# Patient Record
Sex: MALE | Race: WHITE | NOT HISPANIC OR LATINO | Employment: OTHER | URBAN - METROPOLITAN AREA
[De-identification: names, ages, dates, MRNs, and addresses within clinical notes are randomized per-mention and may not be internally consistent; named-entity substitution may affect disease eponyms.]

---

## 2024-08-12 ENCOUNTER — LAB (OUTPATIENT)
Dept: LAB | Facility: CLINIC | Age: 74
End: 2024-08-12
Payer: COMMERCIAL

## 2024-08-12 ENCOUNTER — LAB REQUISITION (OUTPATIENT)
Dept: LAB | Facility: CLINIC | Age: 74
End: 2024-08-12
Payer: COMMERCIAL

## 2024-08-12 DIAGNOSIS — M25.569 PAIN IN UNSPECIFIED KNEE: ICD-10-CM

## 2024-08-12 DIAGNOSIS — M25.569 PAIN IN KNEE JOINT: Primary | ICD-10-CM

## 2024-08-12 DIAGNOSIS — M25.461 EFFUSION, RIGHT KNEE: ICD-10-CM

## 2024-08-12 LAB
BACTERIA SPEC CULT: NORMAL
BASOPHILS # BLD AUTO: 0 10E3/UL (ref 0–0.2)
BASOPHILS NFR BLD AUTO: 0 %
EOSINOPHIL # BLD AUTO: 0.2 10E3/UL (ref 0–0.7)
EOSINOPHIL NFR BLD AUTO: 3 %
ERYTHROCYTE [DISTWIDTH] IN BLOOD BY AUTOMATED COUNT: 13.2 % (ref 10–15)
GRAM STAIN RESULT: NORMAL
GRAM STAIN RESULT: NORMAL
HCT VFR BLD AUTO: 38.3 % (ref 40–53)
HGB BLD-MCNC: 12.5 G/DL (ref 13.3–17.7)
IMM GRANULOCYTES # BLD: 0 10E3/UL
IMM GRANULOCYTES NFR BLD: 0 %
LYMPHOCYTES # BLD AUTO: 0.6 10E3/UL (ref 0.8–5.3)
LYMPHOCYTES NFR BLD AUTO: 11 %
MCH RBC QN AUTO: 30.3 PG (ref 26.5–33)
MCHC RBC AUTO-ENTMCNC: 32.6 G/DL (ref 31.5–36.5)
MCV RBC AUTO: 93 FL (ref 78–100)
MONOCYTES # BLD AUTO: 0.5 10E3/UL (ref 0–1.3)
MONOCYTES NFR BLD AUTO: 9 %
NEUTROPHILS # BLD AUTO: 4.5 10E3/UL (ref 1.6–8.3)
NEUTROPHILS NFR BLD AUTO: 76 %
NRBC # BLD AUTO: 0 10E3/UL
NRBC BLD AUTO-RTO: 0 /100
PLATELET # BLD AUTO: 193 10E3/UL (ref 150–450)
RBC # BLD AUTO: 4.12 10E6/UL (ref 4.4–5.9)
RHEUMATOID FACT SERPL-ACNC: <10 IU/ML
URATE SERPL-MCNC: 5 MG/DL (ref 3.4–7)
WBC # BLD AUTO: 5.9 10E3/UL (ref 4–11)

## 2024-08-12 PROCEDURE — 87075 CULTR BACTERIA EXCEPT BLOOD: CPT | Mod: ORL | Performed by: PHYSICIAN ASSISTANT

## 2024-08-12 PROCEDURE — 89051 BODY FLUID CELL COUNT: CPT | Mod: ORL | Performed by: PHYSICIAN ASSISTANT

## 2024-08-12 PROCEDURE — 36415 COLL VENOUS BLD VENIPUNCTURE: CPT

## 2024-08-12 PROCEDURE — 86431 RHEUMATOID FACTOR QUANT: CPT

## 2024-08-12 PROCEDURE — 86618 LYME DISEASE ANTIBODY: CPT

## 2024-08-12 PROCEDURE — 85025 COMPLETE CBC W/AUTO DIFF WBC: CPT

## 2024-08-12 PROCEDURE — 84550 ASSAY OF BLOOD/URIC ACID: CPT

## 2024-08-12 PROCEDURE — 87205 SMEAR GRAM STAIN: CPT | Mod: ORL | Performed by: PHYSICIAN ASSISTANT

## 2024-08-12 PROCEDURE — 87070 CULTURE OTHR SPECIMN AEROBIC: CPT | Mod: ORL | Performed by: PHYSICIAN ASSISTANT

## 2024-08-13 LAB
APPEARANCE FLD: ABNORMAL
B BURGDOR IGG+IGM SER QL: 0.07
CELL COUNT BODY FLUID SOURCE: ABNORMAL
COLOR FLD: YELLOW
LYMPHOCYTES NFR FLD MANUAL: 31 %
MONOS+MACROS NFR FLD MANUAL: 31 %
NEUTS BAND NFR FLD MANUAL: 38 %
PATH REV: NORMAL
RBC # FLD: 5 /UL
WBC # FLD AUTO: 6777 /UL

## 2024-08-17 LAB — BACTERIA SNV CULT: NO GROWTH

## 2024-08-22 ENCOUNTER — HOSPITAL ENCOUNTER (EMERGENCY)
Facility: CLINIC | Age: 74
Discharge: HOME | End: 2024-08-22
Attending: EMERGENCY MEDICINE | Admitting: EMERGENCY MEDICINE
Payer: COMMERCIAL

## 2024-08-22 ENCOUNTER — NURSE TRIAGE (OUTPATIENT)
Dept: FAMILY MEDICINE | Facility: CLINIC | Age: 74
End: 2024-08-22
Payer: COMMERCIAL

## 2024-08-22 VITALS
DIASTOLIC BLOOD PRESSURE: 79 MMHG | WEIGHT: 245 LBS | OXYGEN SATURATION: 96 % | SYSTOLIC BLOOD PRESSURE: 139 MMHG | HEART RATE: 65 BPM | RESPIRATION RATE: 18 BRPM | TEMPERATURE: 98.1 F

## 2024-08-22 DIAGNOSIS — R53.1 WEAKNESS GENERALIZED: ICD-10-CM

## 2024-08-22 DIAGNOSIS — M25.50 MULTIPLE JOINT PAIN: ICD-10-CM

## 2024-08-22 LAB
ALBUMIN SERPL BCG-MCNC: 4 G/DL (ref 3.5–5.2)
ALP SERPL-CCNC: 74 U/L (ref 40–150)
ALT SERPL W P-5'-P-CCNC: 18 U/L (ref 0–70)
ANION GAP SERPL CALCULATED.3IONS-SCNC: 10 MMOL/L (ref 7–15)
AST SERPL W P-5'-P-CCNC: 16 U/L (ref 0–45)
BASOPHILS # BLD AUTO: 0 10E3/UL (ref 0–0.2)
BASOPHILS NFR BLD AUTO: 0 %
BILIRUB SERPL-MCNC: 0.8 MG/DL
BUN SERPL-MCNC: 24.1 MG/DL (ref 8–23)
CALCIUM SERPL-MCNC: 9.1 MG/DL (ref 8.8–10.4)
CHLORIDE SERPL-SCNC: 101 MMOL/L (ref 98–107)
CREAT SERPL-MCNC: 0.92 MG/DL (ref 0.67–1.17)
CRP SERPL-MCNC: 24.84 MG/L
EGFRCR SERPLBLD CKD-EPI 2021: 86 ML/MIN/1.73M2
EOSINOPHIL # BLD AUTO: 0.3 10E3/UL (ref 0–0.7)
EOSINOPHIL NFR BLD AUTO: 4 %
ERYTHROCYTE [DISTWIDTH] IN BLOOD BY AUTOMATED COUNT: 12.9 % (ref 10–15)
GLUCOSE SERPL-MCNC: 108 MG/DL (ref 70–99)
HCO3 SERPL-SCNC: 26 MMOL/L (ref 22–29)
HCT VFR BLD AUTO: 39.7 % (ref 40–53)
HGB BLD-MCNC: 13.1 G/DL (ref 13.3–17.7)
IMM GRANULOCYTES # BLD: 0 10E3/UL
IMM GRANULOCYTES NFR BLD: 1 %
LYMPHOCYTES # BLD AUTO: 1.5 10E3/UL (ref 0.8–5.3)
LYMPHOCYTES NFR BLD AUTO: 18 %
MAGNESIUM SERPL-MCNC: 2 MG/DL (ref 1.7–2.3)
MCH RBC QN AUTO: 30.3 PG (ref 26.5–33)
MCHC RBC AUTO-ENTMCNC: 33 G/DL (ref 31.5–36.5)
MCV RBC AUTO: 92 FL (ref 78–100)
MONOCYTES # BLD AUTO: 0.9 10E3/UL (ref 0–1.3)
MONOCYTES NFR BLD AUTO: 11 %
NEUTROPHILS # BLD AUTO: 5.5 10E3/UL (ref 1.6–8.3)
NEUTROPHILS NFR BLD AUTO: 67 %
NRBC # BLD AUTO: 0 10E3/UL
NRBC BLD AUTO-RTO: 0 /100
PLATELET # BLD AUTO: 217 10E3/UL (ref 150–450)
POTASSIUM SERPL-SCNC: 4.4 MMOL/L (ref 3.4–5.3)
PROT SERPL-MCNC: 7 G/DL (ref 6.4–8.3)
RBC # BLD AUTO: 4.32 10E6/UL (ref 4.4–5.9)
SODIUM SERPL-SCNC: 137 MMOL/L (ref 135–145)
TSH SERPL DL<=0.005 MIU/L-ACNC: 2.37 UIU/ML (ref 0.3–4.2)
WBC # BLD AUTO: 8.2 10E3/UL (ref 4–11)

## 2024-08-22 PROCEDURE — 84443 ASSAY THYROID STIM HORMONE: CPT | Performed by: EMERGENCY MEDICINE

## 2024-08-22 PROCEDURE — 83735 ASSAY OF MAGNESIUM: CPT | Performed by: EMERGENCY MEDICINE

## 2024-08-22 PROCEDURE — 87798 DETECT AGENT NOS DNA AMP: CPT | Mod: 59 | Performed by: EMERGENCY MEDICINE

## 2024-08-22 PROCEDURE — 99283 EMERGENCY DEPT VISIT LOW MDM: CPT

## 2024-08-22 PROCEDURE — 80053 COMPREHEN METABOLIC PANEL: CPT | Performed by: EMERGENCY MEDICINE

## 2024-08-22 PROCEDURE — 86140 C-REACTIVE PROTEIN: CPT | Performed by: EMERGENCY MEDICINE

## 2024-08-22 PROCEDURE — 85004 AUTOMATED DIFF WBC COUNT: CPT | Performed by: EMERGENCY MEDICINE

## 2024-08-22 PROCEDURE — 36415 COLL VENOUS BLD VENIPUNCTURE: CPT | Performed by: EMERGENCY MEDICINE

## 2024-08-22 RX ORDER — DOXYCYCLINE 100 MG/1
100 CAPSULE ORAL 2 TIMES DAILY
Qty: 28 CAPSULE | Refills: 0 | Status: SHIPPED | OUTPATIENT
Start: 2024-08-22 | End: 2024-09-05

## 2024-08-22 ASSESSMENT — ACTIVITIES OF DAILY LIVING (ADL)
ADLS_ACUITY_SCORE: 35
ADLS_ACUITY_SCORE: 35

## 2024-08-22 ASSESSMENT — COLUMBIA-SUICIDE SEVERITY RATING SCALE - C-SSRS
6. HAVE YOU EVER DONE ANYTHING, STARTED TO DO ANYTHING, OR PREPARED TO DO ANYTHING TO END YOUR LIFE?: NO
1. IN THE PAST MONTH, HAVE YOU WISHED YOU WERE DEAD OR WISHED YOU COULD GO TO SLEEP AND NOT WAKE UP?: NO
2. HAVE YOU ACTUALLY HAD ANY THOUGHTS OF KILLING YOURSELF IN THE PAST MONTH?: NO

## 2024-08-22 NOTE — ED PROVIDER NOTES
Emergency Department Note      History of Present Illness     Chief Complaint   Extremity Weakness      HPI   Stoney Ramos is a 74 year old male with a history of hyperlipidemia, hypertension, gout who presents with extremity weakness. Patient notes that he had a sudden onset of leg aches, shoulder pain, and overall joint and muscle pain a month ago. States that his pain has progressed for the past month. States that he saw a primary care provider in [place], his primary residence, when his pain was only localized to his right knee. Claims that his joint and muscle pain has become more systemic. States that he went to [facility] last week and had fluid drained from his right knee, and received a cortisone shot that helped alleviate some of the pain but did not resolve his weakness. States that the prior [facility] treatment plan consisted of treating gout with Allopurinol and colchicine, but notes that his weakness sustained. States that he hasn't had a gout flare-up in years. Notes that he had a negative lyme's disease test last week, and that is wife administered a dose of amoxicillin for the patient despite the test. Reports that he has been holding himself when using the toilet, can't put shoes on, and is unable to reach the floor due to pain in his hips. Endorses back pain. Endorses hand spasms. Endorses numbness in his 3rd, 4th, 5th toes bilaterally. Endorses a history of L5-S1 back surgery. Of note, patient reports that he is on Lipitor. Denies fevers, chills, cough, runny nose, abdominal pain. Denies diplopia, facial droop, slurred speech. Denies rash. Denies saddle numbness. Denies urinary or bowel incontinence. Denies arm numbness.    Independent Historian   None    Review of External Notes       Past Medical History     Medical History and Problem List   Adjustment disorder  Gout  Depression  Hyperlipidemia  Hypertension   Neuropathy  Osteoarthritis of shoulder  Osteopenia    Medications    Zyloprim  Lipitor  Colcrys  Synthroid    Physical Exam     Patient Vitals for the past 24 hrs:   BP Temp Temp src Pulse Resp SpO2 Weight   08/22/24 1301 139/79 98.1  F (36.7  C) Oral 65 18 96 % 111.1 kg (245 lb)     Physical Exam  Constitutional: Well appearing.  HEENT: Atraumatic.  Moist mucous membranes.  Neck: Soft.  Supple.   Cardiac: Regular rate and rhythm.  No murmur or rub.  Respiratory: Clear to auscultation bilaterally.  No respiratory distress.  No wheezing, rhonchi, or rales.  Abdomen: Soft and nontender.  No guarding.  Nondistended.  Musculoskeletal: No edema.  Normal range of motion.  Full range of motion of the hips.  Bears weight without difficulty. DP and PT pulses 2+ and symmetric with cap refill less than 3 seconds throughout the lower extremities.   Neurologic: Alert and oriented x3.  Normal tone and bulk.  No facial drooping.  Normal speech.  5/5 strength in bilateral upper and lower extremities.  Sensation to light touch intact throughout.  DTRs are 2+ and symmetric at the bilateral patella and Achilles.  Normal gait.  Skin: No rashes.  No edema.  Psych: Normal affect.  Normal behavior.            Diagnostics     Lab Results   Labs Ordered and Resulted from Time of ED Arrival to Time of ED Departure   COMPREHENSIVE METABOLIC PANEL - Abnormal       Result Value    Sodium 137      Potassium 4.4      Carbon Dioxide (CO2) 26      Anion Gap 10      Urea Nitrogen 24.1 (*)     Creatinine 0.92      GFR Estimate 86      Calcium 9.1      Chloride 101      Glucose 108 (*)     Alkaline Phosphatase 74      AST 16      ALT 18      Protein Total 7.0      Albumin 4.0      Bilirubin Total 0.8     CRP INFLAMMATION - Abnormal    CRP Inflammation 24.84 (*)    CBC WITH PLATELETS AND DIFFERENTIAL - Abnormal    WBC Count 8.2      RBC Count 4.32 (*)     Hemoglobin 13.1 (*)     Hematocrit 39.7 (*)     MCV 92      MCH 30.3      MCHC 33.0      RDW 12.9      Platelet Count 217      % Neutrophils 67      % Lymphocytes 18       % Monocytes 11      % Eosinophils 4      % Basophils 0      % Immature Granulocytes 1      NRBCs per 100 WBC 0      Absolute Neutrophils 5.5      Absolute Lymphocytes 1.5      Absolute Monocytes 0.9      Absolute Eosinophils 0.3      Absolute Basophils 0.0      Absolute Immature Granulocytes 0.0      Absolute NRBCs 0.0     TSH WITH FREE T4 REFLEX - Normal    TSH 2.37     MAGNESIUM - Normal    Magnesium 2.0     TICK-BORNE DISEASE PANEL BY PCR       Imaging   No orders to display     Independent Interpretation   None    ED Course      Medications Administered   Medications - No data to display    Procedures   Procedures     Discussion of Management   None    ED Course   ED Course as of 08/22/24 1333   Thu Aug 22, 2024   1304 I obtained history and examined the patient as noted above         Additional Documentation  None    Medical Decision Making / Diagnosis     CMS Diagnoses: None    MIPS       None    [facility]   Stoney Ramos is a 74 year old male who is afebrile and hemodynamically stable.  On exam, he is neurologically intact with no focal deficits.  He expresses vague soreness in his hips and lower back.  He has no red flag back pain symptoms that would necessitate emergent imaging such as MRI to rule out infectious or inflammatory condition such as cauda equina syndrome, epidural abscess, or other.  Lab workup as noted as above.  Wife feels his symptoms are consistent with previous Lyme disease and request antibiotic treatment.  Patient himself thinks it may be related to his statin.  Will stop his statin for a few days and see how he feels.  At this point, he is neurologically intact and can get up without difficulty.  He has trouble touching the floor, however, I do not see any emergent conditions such as cauda equina syndrome, transverse myelitis, or other acute neurologic condition.  I did discuss referral to neurology to rule out any progressive neurologic condition and I placed emergent referral for  primary care as he mainly lives in [place] but is here for few months.  I think is very reasonable and do not believe he requires hospitalization.  He is in agreement and feels comfortable discharging home.  I will provide a paper prescription for doxycycline should his test be positive for Lyme disease.  He is in agreement and feels comfortable with this plan.  His questions were answered.  Again very strict return precautions including any new or changing or progressive neurologic symptoms.  He was in no distress at time of discharge.    Disposition   The patient was discharged.     Diagnosis     ICD-10-CM    1. Multiple joint pain  M25.50 Primary Care Referral      2. Weakness generalized  R53.1 Primary Care Referral     Adult Neurology  Referral           Discharge Medications   Discharge Medication List as of 8/22/2024  2:59 PM        START taking these medications    Details   doxycycline hyclate (VIBRAMYCIN) 100 MG capsule Take 1 capsule (100 mg) by mouth 2 times daily for 14 days., Disp-28 capsule, R-0, Local Print               Scribe Disclosure:  I, Beny Jackson, am serving as a scribe at 1:33 PM on 8/22/2024 to document services personally performed by Ruiz Gooden MD based on my observations and the provider's statements to me.        Ruiz Gooden MD  08/22/24 1932

## 2024-08-22 NOTE — ED TRIAGE NOTES
Hip, back pain, loss of feeling in toes, one week ago had swelling in right knee, had fluid taken off. Legs cramping, loss of strength. Concerned about Lymes disease, but he was tested last week and it was negative. Received one amoxicillin two hours ago.

## 2024-08-22 NOTE — TELEPHONE ENCOUNTER
Nurse Triage SBAR    Is this a 2nd Level Triage? YES, LICENSED PRACTITIONER REVIEW IS REQUIRED    Situation: weakness  and numbness in LE extremities after back/hip pain for one month or more. Difficulty getting up and down sitting position.     Background: Hx of back surgery    Assessment: weakness  and numbness in LE extremities after back/hip pain for one month or more. Difficulty getting up and down sitting position.     Protocol Recommended Disposition:   Call ADS/Go to ED/UCC Now (Or To Office with PCP Approval)    Recommendation:  Writer advised ED r/t neurological deficit with LE weakness/numbness in toes    Does the patient meet one of the following criteria for ADS visit consideration? 16+ years old, with an [facility] PCP     TIP  Providers, please consider if this condition is appropriate for management at one of our Acute and Diagnostic Services sites.     If patient is a good candidate, please use dotphrase <dot>triageresponse and select Refer to ADS to document.       Reason for Disposition   Neurologic deficit that was brief (now gone), ANY of the following: * Weakness of the face, arm, or leg on one side of the body * Numbness of the face, arm, or leg on one side of the body * Loss of speech or garbled speech    Additional Information   Negative: Difficult to awaken or acting confused (e.g., disoriented, slurred speech)   Negative: New neurologic deficit that is present NOW, sudden onset of ANY of the following: * Weakness of the face, arm, or leg on one side of the body* Numbness of the face, arm, or leg on one side of the body* Loss of speech or garbled speech   Negative: Sounds like a life-threatening emergency to the triager   Negative: SEVERE weakness (i.e., unable to walk or barely able to walk, requires support) and new-onset or worsening   Negative: Confusion, disorientation, or hallucinations is main symptom   Negative: Dizziness is main symptom   Negative: Followed a head injury within last 3  "days   Negative: Headache (with neurologic deficit)   Negative: Unable to urinate (or only a few drops) and bladder feels very full   Negative: Loss of bladder or bowel control (urine or bowel incontinence; wetting self, leaking stool) of new-onset   Negative: Back pain with numbness (loss of sensation) in groin or rectal area    Answer Assessment - Initial Assessment Questions  1. SYMPTOM: \"What is the main symptom you are concerned about?\" (e.g., weakness, numbness)      Weakness in LE bilaterally  2. ONSET: \"When did this start?\" (minutes, hours, days; while sleeping)      A month ago  3. LAST NORMAL: \"When was the last time you (the patient) were normal (no symptoms)?\"      Before that  4. PATTERN \"Does this come and go, or has it been constant since it started?\"  \"Is it present now?\"      Constant, present now  5. CARDIAC SYMPTOMS: \"Have you had any of the following symptoms: chest pain, difficulty breathing, palpitations?\"      no  6. NEUROLOGIC SYMPTOMS: \"Have you had any of the following symptoms: headache, dizziness, vision loss, double vision, changes in speech, unsteady on your feet?\"      Unsteady on my feet  7. OTHER SYMPTOMS: \"Do you have any other symptoms?\"      Back pain, hip pain  8. PREGNANCY: \"Is there any chance you are pregnant?\" \"When was your last menstrual period?\"     N/A    Protocols used: Neurologic Deficit-A-OH    "

## 2024-08-23 LAB
A PHAGOCYTOPH DNA BLD QL NAA+PROBE: NOT DETECTED
BABESIA DNA BLD QL NAA+PROBE: NOT DETECTED
EHRLICHIA DNA SPEC QL NAA+PROBE: NOT DETECTED

## 2024-08-26 ENCOUNTER — OFFICE VISIT (OUTPATIENT)
Dept: FAMILY MEDICINE | Facility: CLINIC | Age: 74
End: 2024-08-26
Payer: COMMERCIAL

## 2024-08-26 VITALS
DIASTOLIC BLOOD PRESSURE: 67 MMHG | BODY MASS INDEX: 30.2 KG/M2 | WEIGHT: 242.9 LBS | HEIGHT: 75 IN | RESPIRATION RATE: 16 BRPM | OXYGEN SATURATION: 96 % | SYSTOLIC BLOOD PRESSURE: 103 MMHG | HEART RATE: 69 BPM | TEMPERATURE: 97.7 F

## 2024-08-26 DIAGNOSIS — M35.3 PMR (POLYMYALGIA RHEUMATICA) (H): Primary | ICD-10-CM

## 2024-08-26 LAB — BACTERIA SNV CULT: NORMAL

## 2024-08-26 PROCEDURE — G2211 COMPLEX E/M VISIT ADD ON: HCPCS | Performed by: NURSE PRACTITIONER

## 2024-08-26 PROCEDURE — 99203 OFFICE O/P NEW LOW 30 MIN: CPT | Performed by: NURSE PRACTITIONER

## 2024-08-26 RX ORDER — PREDNISONE 5 MG/1
15 TABLET ORAL DAILY
Qty: 90 TABLET | Refills: 1 | Status: SHIPPED | OUTPATIENT
Start: 2024-08-26 | End: 2024-10-03

## 2024-08-26 RX ORDER — LEVOTHYROXINE SODIUM 50 UG/1
50 TABLET ORAL DAILY
COMMUNITY

## 2024-08-26 RX ORDER — ALLOPURINOL 300 MG/1
300 TABLET ORAL DAILY
COMMUNITY

## 2024-08-26 ASSESSMENT — PAIN SCALES - GENERAL: PAINLEVEL: SEVERE PAIN (6)

## 2024-08-26 NOTE — PATIENT INSTRUCTIONS
Start with 15mg daily for 2-4 weeks. When you are feeling better you can cut back to 12.5mg. Then after 2-4 weeks you can cut back to 10mg.   Then after 10mg we need to cut back by 1mg. You can discuss at your next appt       [facility] for the Calcium score       
Statement Selected

## 2024-08-26 NOTE — PROGRESS NOTES
"  Assessment & Plan     PMR (polymyalgia rheumatica) (H24)  Suspect this is PMR. He had elevated CRP and multiple joint pain, notably of shoulder. Already tested neg for tickborne and is on doxy as a trial. Will start prednisone. He will plan to follow-up in 6-8 weeks for recheck, or sooner with worsening symptoms. May need rheum follow-up if persists. Did not recheck labs today but can consider this at follow-up   - predniSONE (DELTASONE) 5 MG tablet; Take 3 tablets (15 mg) by mouth daily. Taper as instructed        MED REC REQUIRED  Post Medication Reconciliation Status: patient was not discharged from an inpatient facility or TCU          Patient Instructions   Start with 15mg daily for 2-4 weeks. When you are feeling better you can cut back to 12.5mg. Then after 2-4 weeks you can cut back to 10mg.   Then after 10mg we need to cut back by 1mg. You can discuss at your next appt       [facility] for the Calcium score       Subjective   Stoney is a 74 year old, presenting for the following health issues:  No chief complaint on file.    [facility]       ED/UC Followup:    Facility:   ED  Date of visit: 8-  Reason for visit: Extremity Weakness      About 3 weeks ago started having joint issues. He now can't get out of bed by himself. His symptoms were pretty sudden   Shoulder is the most painful   Seen in ED. Had nice workup-neg tick borne and high CRP  Had knee drained and steroid injections   Also had back pain.   Now wrist is also sore   Has a history of lymes   Has been on doxy for 3 days   Also on a statin         Review of Systems  Detailed as above       Objective    /67 (BP Location: Left arm, Patient Position: Sitting, Cuff Size: Adult Large)   Pulse 69   Temp 97.7  F (36.5  C) (Oral)   Resp 16   Ht 1.892 m (6' 2.5\")   Wt 110.2 kg (242 lb 14.4 oz)   SpO2 96%   BMI 30.77 kg/m    There is no height or weight on file to calculate BMI.  Physical Exam  Constitutional:       Appearance: " Normal appearance.   Pulmonary:      Effort: Pulmonary effort is normal.   Neurological:      Mental Status: He is alert.   Psychiatric:         Mood and Affect: Mood normal.                    Signed Electronically by: PEGGY Fleming CNP

## 2024-08-30 ENCOUNTER — TELEPHONE (OUTPATIENT)
Dept: FAMILY MEDICINE | Facility: CLINIC | Age: 74
End: 2024-08-30
Payer: COMMERCIAL

## 2024-08-30 NOTE — TELEPHONE ENCOUNTER
Patient called the clinic wondering wondering how he should taper the prednisone. Read through the tapering directions from visit notes to provider. He stated understanding and had no further questions.    Anahi INIGUEZ RN  [facility] Triage Team

## 2024-09-03 ENCOUNTER — TELEPHONE (OUTPATIENT)
Dept: CARDIOLOGY | Facility: CLINIC | Age: 74
End: 2024-09-03
Payer: COMMERCIAL

## 2024-09-03 NOTE — TELEPHONE ENCOUNTER
9/3/24 I spoke with pt, but he didn't have the time to schedule. He is going to call back, [facility]    Phone Message    May a detailed message be left on voicemail: yes     Reason for Call: Other: Patient will like to schedule appt for CT coronary calcium scan in [place], and will be ok with paying out of pocket. Please call patient back at [phone] to further coordinate.     Action Taken: Other: Cardiology    Travel Screening: Not Applicable     Thank you!  [facility]

## 2024-09-04 ENCOUNTER — NURSE TRIAGE (OUTPATIENT)
Dept: FAMILY MEDICINE | Facility: CLINIC | Age: 74
End: 2024-09-04
Payer: COMMERCIAL

## 2024-09-04 NOTE — TELEPHONE ENCOUNTER
Triage Patient Outreach    Attempt # 1    Was call answered?  No.  Left message on voicemail with information to call triage back.    On callback - please triage report of worsening pain     Also sent [facility] message to patient to callback to triage    Tsering ARGUELLES  [facility]

## 2024-09-05 ENCOUNTER — NURSE TRIAGE (OUTPATIENT)
Dept: NURSING | Facility: CLINIC | Age: 74
End: 2024-09-05
Payer: COMMERCIAL

## 2024-09-05 NOTE — TELEPHONE ENCOUNTER
Pt is in [place]. See 09/05/2024 nurse triage encounter. Pt was contacted today with providers advice.

## 2024-09-05 NOTE — TELEPHONE ENCOUNTER
If he can get in in [place] right away that would be the best option. That way he can get labs done right away. I don't need to be in contact with his care team for this.    He should stay at 15mg for now until he is seen again.   Doxy is fine with prednisone.     PEGGY Fleming CNP

## 2024-09-05 NOTE — TELEPHONE ENCOUNTER
"Nurse Triage SBAR    Is this a 2nd Level Triage? NO  Status Update    Situation:   New PMR symptoms while on prednisone.    Background:   Pt currently in [place].  Therefore receiving information primarily.  Was prescribed doxycycline at ED 8/22/24 due to multiple joint pain with elevated CRP labs.  Has 3.5 days left to complete regimen.  Later prescribed prednisone at OV 8/26/24 for PMR.  Pred dosing instructed as follows:  Start with 15mg daily for 2-4 weeks. When you are feeling better you can cut back to 12.5mg. Then after 2-4 weeks you can cut back to 10mg.   Then after 10mg we need to cut back by 1mg. You can discuss at your next appt     Patient's Report:   Now \"new areas of pain\".  Pain was previously localized to R shoulder and R knee.  \"R knee is better but not gone.\"  \"R shoulder still 'hurts'.\"  Uncertain whether improved at all.  R shoulder \"still causes sleeping problems.\"  Now L shoulder is also \"acting up.\"  However \"R shoulder is the bad one.\"  \"R wrist pain also new.\"    Pt's Questions:  1)  Does pred dosing need adjustment?  -> does pred conflict with doxycycline?  -> should doxy be stopped?  2)  Does pt need updated labs?  -> for updated CRP Inflammatory Marker results for example?  3)  Can care be coordinated with his [place] provider ([facility])?  -> such as lab orders?    Pt will return to [place] Sept 15th.  Now attempting to schedule OV with central scheduler for clinic follow-up, however no schedule options show as available with provider Kimberly Solano-CNP after this date.    Pt requests call back for advice re current med dosing and potential coordination of care with [place] provider.  Thank you-    Marina Márquez, RN  [facility] Nurse Advisor     Reason for Disposition   [1] Follow-up call from patient regarding patient's clinical status AND [2] information NON-URGENT    Protocols used: PCP Call - No Triage-A-AH    "

## 2024-09-16 ENCOUNTER — VIRTUAL VISIT (OUTPATIENT)
Dept: FAMILY MEDICINE | Facility: CLINIC | Age: 74
End: 2024-09-16
Payer: COMMERCIAL

## 2024-09-16 DIAGNOSIS — M35.3 PMR (POLYMYALGIA RHEUMATICA) (H): Primary | ICD-10-CM

## 2024-09-16 PROCEDURE — 99214 OFFICE O/P EST MOD 30 MIN: CPT | Mod: 95 | Performed by: NURSE PRACTITIONER

## 2024-09-16 NOTE — PROGRESS NOTES
Doroteo is a 74 year old who is being evaluated via a billable video visit.    How would you like to obtain your AVS? Lauren,VIDEO  If the video visit is dropped, the invitation should be resent by: Text to cell phone: [phone]  Will anyone else be joining your video visit? No      Assessment & Plan     PMR (polymyalgia rheumatica) (H24)  I saw pt a few weeks ago and symptoms were c/w PMR. We started prednisone which has helped but still is having a fluctuating course. His prednisone was increased in the ED. This is helping but continues with symptoms.  I have concerns that this is actually not PMR and a different autoimmune condition.  Recommend that he follows up with rheumatology for these persistent severe joint pains  - Adult Rheumatology  Referral; Future          Subjective   Doroteo is a 74 year old, presenting for the following health issues:  No chief complaint on file.      Video Start Time: 12:43 PM    History of Present Illness       Reason for visit:  Polymyalgia rheumatica pain and questions    He eats 2-3 servings of fruits and vegetables daily.He consumes 1 sweetened beverage(s) daily.He exercises with enough effort to increase his heart rate 10 to 19 minutes per day.  He exercises with enough effort to increase his heart rate 3 or less days per week.   He is taking medications regularly.     In [place] ER 9-12-2-24, Polymyalgia    Patient was seen in the ED on 8/22 and was started on doxycycline due to multiple joint pain  I saw him on 8/26 and started him on prednisone for potential PMR considering presentation and elevated CRP  Right shoulder is still painful, but not bad. Can wake him up at night     Currently has pains in right wrist, right elbow, right shoulder. Left shoulder acting up   Had bad spasms from groin down front/inner leg     Increased exercise the last 3 days   Is pushing water and doing well with diet     Stopped statin   Has meloxicam 14 day supply from [place] ED provider    Had some really rough days since     Getting out of bed is about 3/10 when previously was severe.   Still can have days where his right arm does not want to function because of the pain and will struggle to get out of bed      Review of Systems  Detailed as above       Objective           Vitals:  No vitals were obtained today due to virtual visit.    Physical Exam   GENERAL: alert and no distress  EYES: Eyes grossly normal to inspection.  No discharge or erythema, or obvious scleral/conjunctival abnormalities.  RESP: No audible wheeze, cough, or visible cyanosis.    SKIN: Visible skin clear. No significant rash, abnormal pigmentation or lesions.  NEURO: Cranial nerves grossly intact.  Mentation and speech appropriate for age.  PSYCH: Appropriate affect, tone, and pace of words          Video-Visit Details    Type of service:  Video Visit   Video End Time:1:16 PM  Originating Location (pt. Location): Home    Distant Location (provider location):  On-site  Platform used for Video Visit: Moira  Signed Electronically by: PEGGY Fleming CNP

## 2024-10-03 ENCOUNTER — OFFICE VISIT (OUTPATIENT)
Dept: RHEUMATOLOGY | Facility: CLINIC | Age: 74
End: 2024-10-03

## 2024-10-03 ENCOUNTER — HOSPITAL ENCOUNTER (OUTPATIENT)
Dept: CARDIOLOGY | Facility: CLINIC | Age: 74
Discharge: HOME | End: 2024-10-03
Payer: COMMERCIAL

## 2024-10-03 ENCOUNTER — LAB (OUTPATIENT)
Dept: LAB | Facility: CLINIC | Age: 74
End: 2024-10-03

## 2024-10-03 VITALS
DIASTOLIC BLOOD PRESSURE: 58 MMHG | BODY MASS INDEX: 30.58 KG/M2 | OXYGEN SATURATION: 97 % | SYSTOLIC BLOOD PRESSURE: 118 MMHG | HEART RATE: 72 BPM | WEIGHT: 241.4 LBS

## 2024-10-03 DIAGNOSIS — Z13.6 SCREENING FOR HEART DISEASE: ICD-10-CM

## 2024-10-03 DIAGNOSIS — M19.90 INFLAMMATORY ARTHRITIS: ICD-10-CM

## 2024-10-03 DIAGNOSIS — Z79.899 HIGH RISK MEDICATION USE: ICD-10-CM

## 2024-10-03 DIAGNOSIS — M25.50 POLYARTHRALGIA: ICD-10-CM

## 2024-10-03 DIAGNOSIS — M19.90 INFLAMMATORY ARTHRITIS: Primary | ICD-10-CM

## 2024-10-03 LAB — ERYTHROCYTE [SEDIMENTATION RATE] IN BLOOD BY WESTERGREN METHOD: 25 MM/HR (ref 0–20)

## 2024-10-03 PROCEDURE — 99204 OFFICE O/P NEW MOD 45 MIN: CPT | Performed by: INTERNAL MEDICINE

## 2024-10-03 PROCEDURE — 86200 CCP ANTIBODY: CPT

## 2024-10-03 PROCEDURE — 86803 HEPATITIS C AB TEST: CPT

## 2024-10-03 PROCEDURE — G2211 COMPLEX E/M VISIT ADD ON: HCPCS | Performed by: INTERNAL MEDICINE

## 2024-10-03 PROCEDURE — 86039 ANTINUCLEAR ANTIBODIES (ANA): CPT

## 2024-10-03 PROCEDURE — 36415 COLL VENOUS BLD VENIPUNCTURE: CPT

## 2024-10-03 PROCEDURE — 75571 CT HRT W/O DYE W/CA TEST: CPT | Mod: 26 | Performed by: INTERNAL MEDICINE

## 2024-10-03 PROCEDURE — 86140 C-REACTIVE PROTEIN: CPT

## 2024-10-03 PROCEDURE — 85652 RBC SED RATE AUTOMATED: CPT

## 2024-10-03 PROCEDURE — 86038 ANTINUCLEAR ANTIBODIES: CPT

## 2024-10-03 PROCEDURE — 75571 CT HRT W/O DYE W/CA TEST: CPT

## 2024-10-03 RX ORDER — PREDNISONE 2.5 MG/1
TABLET ORAL
Qty: 270 TABLET | Refills: 0 | Status: SHIPPED | OUTPATIENT
Start: 2024-10-03 | End: 2024-12-02

## 2024-10-03 RX ORDER — FLUTICASONE PROPIONATE 50 MCG
2 SPRAY, SUSPENSION (ML) NASAL
COMMUNITY

## 2024-10-03 RX ORDER — VALACYCLOVIR HYDROCHLORIDE 500 MG/1
500 TABLET, FILM COATED ORAL
COMMUNITY
Start: 2024-04-09

## 2024-10-03 NOTE — PROGRESS NOTES
This document was created using a software with less than 100% fidelity, at times resulting in unintended, even erroneous syntax and grammar.  The reader is advised to keep this under consideration while reviewing, interpreting this note.      Rheumatology Consult Note      Stoney Ramos     YOB: 1950 Age: 74 year old    Date of visit: 10/03/24    PCP: [facility]    Chief Complaint   Patient presents with:  Consult      Assessment and Plan   Doroteo was seen today for consult.    Diagnoses and all orders for this visit:    Inflammatory arthritis  -     Cyclic Citrullinated Peptide Antibody IgG; Future  -     Erythrocyte sedimentation rate auto; Future  -     CRP inflammation; Future  -     Anti Nuclear Iliana IgG by IFA with Reflex; Future  -     predniSONE (DELTASONE) 2.5 MG tablet; Take 7 tablets (17.5 mg) by mouth daily for 10 days, THEN 6 tablets (15 mg) daily for 10 days, THEN 5 tablets (12.5 mg) daily for 10 days, THEN 4 tablets (10 mg) daily for 10 days, THEN 3 tablets (7.5 mg) daily for 10 days, THEN 2 tablets (5 mg) daily for 10 days. 7.5 mg daily for 4 weeks, reduce by 2.5 mg every 4 weeks to 5 milligrams daily, and to 2.5 mg daily and stop.  -     Hepatitis C antibody; Future    Polyarthralgia  -     predniSONE (DELTASONE) 2.5 MG tablet; Take 7 tablets (17.5 mg) by mouth daily for 10 days, THEN 6 tablets (15 mg) daily for 10 days, THEN 5 tablets (12.5 mg) daily for 10 days, THEN 4 tablets (10 mg) daily for 10 days, THEN 3 tablets (7.5 mg) daily for 10 days, THEN 2 tablets (5 mg) daily for 10 days. 7.5 mg daily for 4 weeks, reduce by 2.5 mg every 4 weeks to 5 milligrams daily, and to 2.5 mg daily and stop.  -     Hepatitis C antibody; Future    High risk medication use    Other orders  -     Adult Rheumatology  Referral         This patient presents to the polyarthralgias in the background of longer standing right shoulder discomfort which is consistent with  tendinopathy he had a new onset of symptoms around mid to late July.  This is turned out to be an inflammatory arthritis based on the effusion obtained from his right knee at orthopedics attached below.  With the prednisone he feels nearly back to normal. Today I have explained to him various categories of arthritis ranging from osteoarthritis inflammatory in multiple categories underneath inflammatory we talked about self-limited versus longstanding inflammatory joint disease his time scale is such that it would be not unreasonable to consider the possibility that this might turn out to be self-limited.  This is outlined the pros and cons of that approach discussed.  He would like to go for that.  Will begin to taper prednisone as noted we will meet here in the next 6-8 weeks.  Meanwhile today's workup as noted.     The longitudinal plan of care for the diagnosis(es)/condition(s) as documented were addressed during this visit. Due to the added complexity in care, I will continue to support Doroteo in the subsequent management and with ongoing continuity of care.    Return to clinic: 2 months      HPI   Stoney Ramos is a 74 year old male  is seen today for evaluation of polyarthralgias.  He reports that while over the past year or so he has had some discomfort in his shoulder especially the right side that he would describes as catching phenomenon but able to manage his day-to-day activities without difficulty with some worsening earlier this year in January when he was seen at his physician's office and [facility] those data are available in the chart and reviewed.  Few months ago it would appear around the summer this year he started experiencing worsening of the pain which was more anterior and posterior affecting the right shoulder, there was some radiation around the right biceps he also had pain in the right wrist, right knee.  The shoulder and the knee pain was the worst.  When he was finally seen at  his physician's office a question of PMR was entertained.  He was started on prednisone initially 15 he increase that to 20.  There was a brisk improvement to the order of around 50%.  He then went on to have worsening pain in his right knee, that had not responded to the oral prednisone as was the case with the wrist that also was not all that responsive to that prednisone.  He was seen in orthopedics and had aspiration of the right knee joint.  Fortunately those data are available and consistent with an inflammatory arthropathy profile.  No crystals it seems were obtained, definitely not reported.  He remembers that after the aspiration corticosteroid injection was put in.  Currently he is on prednisone 20 mg.  Now he feels almost back to normal.  He would certainly not feel that there is need to seek medical advice for his joint symptoms as it stands today.  He remembers that during the peak of his symptoms his abduction of the right shoulder knee demonstrates that, would be no more than 30 to 40 degrees.  Now he has full abduction.  He describes history of gout in the first MTPs for which she is on allopurinol most recent uric acid was 5.  He reports no personal or family history of psoriasis ulcerative colitis Crohn's disease there is no family history of lupus rheumatoid arthritis ankylosing spondylitis.  He is retired he used to run a company in computer programming.  He is not however a smoker.  He has noted some stress going on in terms of medical related issues.     Latest Reference Range & Units 08/12/24 08:00   Cell Count Fluid Source  Knee, Right   RBC Fluid /uL 5   Total Nucleated Cells /uL 6,777   % Neutrophils Fluid % 38   % Lymphocytes Fluid % 31   % Mono/Macro Fluid % 31   Color Fluid Colorless, Yellow  Yellow   Appearance Fluid Clear  Cloudy !   Pathologist Review Comments (Body Fluid)   See Comment   !: Data is abnormal       Active Problem List   There is no problem list on file for this  patient.    Past Medical History   No past medical history on file.  Past Surgical History   No past surgical history on file.  Allergy   No Known Allergies  Current Medication List     Current Outpatient Medications   Medication Sig Dispense Refill    allopurinol (ZYLOPRIM) 300 MG tablet Take 300 mg by mouth daily. Taking 150 mg daily      levothyroxine (SYNTHROID/LEVOTHROID) 50 MCG tablet Take 50 mcg by mouth daily.      predniSONE (DELTASONE) 5 MG tablet Take 3 tablets (15 mg) by mouth daily. Taper as instructed (Patient taking differently: Take 20 mg by mouth daily. Taking 20 mg daily on 9-) 90 tablet 1     No current facility-administered medications for this visit.       No current facility-administered medications for this visit.        Family History   No family history on file.      Physical Exam     COMPREHENSIVE EXAMINATION:  Vitals:    10/03/24 1446   BP: 118/58   BP Location: Right arm   Pulse: 72   SpO2: 97%   Weight: 109.5 kg (241 lb 6.4 oz)     A well appearing alert oriented male. Vital data as noted above. His eyes examined for inflammation/scleromalacia. ENT examined for oral mucositis, moisture, thrush, nasal deformity, external ear redness, deformity. His neck is examined for suppleness and lymphadenopathy.  Cardiopulmonary examination without dyspnea at rest, use of accessory muscles of breathing, wheezing, edema, peripheral or central cyanosis.  Abdomen examined for softness, tenderness, obvious organomegaly.  Skin examined for heliotrope, malar area eruption, lupus pernio, periungual erythema, sclerodactyly, papules, erythema nodosum, purpura, nail pitting, onycholysis, and obvious psoriasis lesion. Neurological examination done for alertness, speech, facial symmetry,  tone and power in upper and lower extremities, and gait. The joint examination is performed for swelling, tenderness, warmth, erythema, and range of motion in the following joints: DIPs, PIPs, MCPs, wrists, first CMC's,  elbows, shoulders, hips, knees, ankles, feet; spine for range of motion and paraspinal muscles for tenderness. The salient normal / abnormal findings are appended.  He does not have synovitis in any of the palpable joints of upper and lower extremities.  He has full abduction at his shoulders bilaterally his internal rotation is limited to sacroiliac joint and that is longstanding.  He has warmth of the right knee without detectable effusion clinically.  He has full range of motion at the hip special as well as the knees.  He does not have dactylitis of digits or toes.  There is no enthesopathy at the insertion of Achilles tendons.  He has minimal Heberden's.    Labs / Imaging (select studies)     Rheumatoid Factor   Date Value Ref Range Status   08/12/2024 <10 <14 IU/mL Final     Uric Acid   Date Value Ref Range Status   08/12/2024 5.0 3.4 - 7.0 mg/dL Final      Hemoglobin   Date Value Ref Range Status   08/22/2024 13.1 (L) 13.3 - 17.7 g/dL Final   08/12/2024 12.5 (L) 13.3 - 17.7 g/dL Final     Urea Nitrogen   Date Value Ref Range Status   08/22/2024 24.1 (H) 8.0 - 23.0 mg/dL Final     AST   Date Value Ref Range Status   08/22/2024 16 0 - 45 U/L Final     Albumin   Date Value Ref Range Status   08/22/2024 4.0 3.5 - 5.2 g/dL Final     Alkaline Phosphatase   Date Value Ref Range Status   08/22/2024 74 40 - 150 U/L Final     ALT   Date Value Ref Range Status   08/22/2024 18 0 - 70 U/L Final     Rheumatoid Factor   Date Value Ref Range Status   08/12/2024 <10 <14 IU/mL Final          Immunization History     Immunization History   Administered Date(s) Administered    COVID-19 Monovalent 18+ (Moderna) 02/10/2021, 03/10/2021, 12/04/2021, 12/08/2023    Hepatitis A (ADULT 19+) 06/01/1997, 09/01/1998, 12/11/2007    Hepatitis B, Adult 12/11/2007, 01/15/2008    Influenza (High Dose) Trivalent,PF (Fluzone) 09/16/2015, 09/29/2016, 09/13/2017    Influenza (IIV3) PF 09/02/2009, 09/07/2011, 09/21/2014    Influenza Intranasal  Vaccine 11/19/2023    Influenza Vaccine 18-64 (Flublok) 10/10/2018, 10/14/2019, 08/31/2020    Influenza Vaccine 65+ (FLUAD) 10/01/2021, 09/23/2022    Influenza Vaccine 65+ (Fluzone HD) 09/16/2015, 09/13/2017    Influenza Vaccine IM Ages 6-35 Months 4 Valent (PF) 09/18/2013    Influenza Vaccine, 6+MO IM (QUADRIVALENT W/PRESERVATIVES) 09/22/2014, 10/01/2021    OPV, trivalent, live 12/19/1994    Pneumo Conj 13-V (2010&after) 09/16/2015    Pneumococcal 23 valent 09/18/2013    RSV Vaccine (Arexvy) 12/07/2023    Synagis 12/08/2023    TDAP (Adacel,Boostrix) 12/11/2007    Td (Adult), Adsorbed 06/01/1997    Td, Absorbed, Pf, Adult, Lf Unspecified 10/10/2018    Zoster recombinant adjuvanted (SHINGRIX) 03/26/2019, 05/21/2019    Zoster vaccine, live 10/06/2008               2 seconds or less

## 2024-10-04 LAB
ANA PAT SER IF-IMP: ABNORMAL
ANA SER QL IF: POSITIVE
ANA TITR SER IF: ABNORMAL {TITER}
CRP SERPL-MCNC: 11.7 MG/L
HCV AB SERPL QL IA: NONREACTIVE

## 2024-10-04 NOTE — PROGRESS NOTES
Writer called patient, reviewed calcium screeing results. Questions answered. Pt verbalized he will follow up with primary MD

## 2024-10-07 LAB — CCP AB SER IA-ACNC: 1 U/ML

## 2024-10-29 ENCOUNTER — OFFICE VISIT (OUTPATIENT)
Dept: FAMILY MEDICINE | Facility: CLINIC | Age: 74
End: 2024-10-29
Payer: COMMERCIAL

## 2024-10-29 VITALS
RESPIRATION RATE: 16 BRPM | TEMPERATURE: 98.2 F | OXYGEN SATURATION: 96 % | HEART RATE: 59 BPM | WEIGHT: 244.3 LBS | SYSTOLIC BLOOD PRESSURE: 126 MMHG | HEIGHT: 75 IN | DIASTOLIC BLOOD PRESSURE: 72 MMHG | BODY MASS INDEX: 30.37 KG/M2

## 2024-10-29 DIAGNOSIS — M19.90 INFLAMMATORY ARTHRITIS: ICD-10-CM

## 2024-10-29 DIAGNOSIS — F33.1 MODERATE RECURRENT MAJOR DEPRESSION (H): ICD-10-CM

## 2024-10-29 DIAGNOSIS — M1A.00X0 IDIOPATHIC CHRONIC GOUT WITHOUT TOPHUS, UNSPECIFIED SITE: ICD-10-CM

## 2024-10-29 DIAGNOSIS — R79.89 LOW TESTOSTERONE IN MALE: ICD-10-CM

## 2024-10-29 DIAGNOSIS — Z76.89 ENCOUNTER TO ESTABLISH CARE: Primary | ICD-10-CM

## 2024-10-29 DIAGNOSIS — E78.5 HYPERLIPIDEMIA LDL GOAL <100: ICD-10-CM

## 2024-10-29 DIAGNOSIS — E03.9 HYPOTHYROIDISM, UNSPECIFIED TYPE: ICD-10-CM

## 2024-10-29 PROCEDURE — G2211 COMPLEX E/M VISIT ADD ON: HCPCS | Performed by: PHYSICIAN ASSISTANT

## 2024-10-29 PROCEDURE — 99214 OFFICE O/P EST MOD 30 MIN: CPT | Performed by: PHYSICIAN ASSISTANT

## 2024-10-29 RX ORDER — ATORVASTATIN CALCIUM 10 MG/1
TABLET, FILM COATED ORAL
COMMUNITY
Start: 2024-10-13

## 2024-10-29 RX ORDER — TESTOSTERONE 1.62 MG/G
1 GEL TRANSDERMAL DAILY
Qty: 75 G | Refills: 1 | Status: SHIPPED | OUTPATIENT
Start: 2024-10-29

## 2024-10-29 RX ORDER — FEXOFENADINE HCL 180 MG/1
180 TABLET ORAL
COMMUNITY
Start: 2024-05-15 | End: 2025-05-15

## 2024-10-29 ASSESSMENT — PAIN SCALES - GENERAL: PAINLEVEL_OUTOF10: MILD PAIN (3)

## 2024-10-29 NOTE — PROGRESS NOTES
"Assessment & Plan     Encounter to establish care  Spent majority of the visit history of the patient reviewing his past medical history.  Due for annual wellness visit in May.    Inflammatory arthritis  Upcoming follow-up with rheumatology.  Continue prednisone taper.    Moderate recurrent major depression (H)  Stable.    Hypothyroidism, unspecified type  Continue Synthroid    Hyperlipidemia LDL goal <100  Continue statin.    Idiopathic chronic gout without tophus, unspecified site  Continue allopurinol    Low testosterone in male  AndroGel refilled      30 minutes spent by me on the date of the encounter on chart review, review of test results, interpretation of tests, patient visit, and documentation       The longitudinal plan of care for the diagnosis(es)/condition(s) as documented were addressed during this visit. Due to the added complexity in care, I will continue to support Doroteo in the subsequent management and with ongoing continuity of care.    BMI  Estimated body mass index is 30.95 kg/m  as calculated from the following:    Height as of this encounter: 1.892 m (6' 2.5\").    Weight as of this encounter: 110.8 kg (244 lb 4.8 oz).   Weight management plan: Discussed healthy diet and exercise guidelines      No follow-ups on file.    The likelihood of other entities in the differential is insufficient to justify any further testing for them at this time. This was explained to the patient. The patient was advised that persistent or worsening symptoms would require further evaluation. Patient advised to call the office and if unable to reach to go to the emergency room if they develop any new or worsening symptoms. Expressed understanding and agreement with above stated plan.     TONY Can [facility] GEOVANNA Pascualelizabeth is a pleasant 74 year old male presenting for the following health issues:  Patient presents with:  Establish Care    Here today to establish care " "with a PCP in [facility] we have living in [place] but also have homes in [place].     -Recent evaluations with our same-day provider Janene as well as a few emergency department visits for inflammatory arthritis.  On a prednisone taper.  Following with rheumatology.  Gradual improvement.  Still notes pain in right knee/right shoulder.  Worse morning.    -Requesting refill on his AndroGel.  Testosterone level last drawn in May. 300s.    -History of hyperlipidemia.  On atorvastatin 10 mg daily.  Coronary calcium score from 2024 with a score of 25.7.    -History of gout for which he takes 150 mg of allopurinol daily.    -History of hypothyroidism.  Recent labs drawn.  Takes Synthroid 50 mcg.       Review of Systems   Constitutional, HEENT, cardiovascular, pulmonary, GI, , musculoskeletal, neuro, skin, endocrine and psych systems are negative, except as otherwise noted.      Objective    /72 (BP Location: Right arm, Patient Position: Sitting, Cuff Size: Adult Large)   Pulse 59   Temp 98.2  F (36.8  C) (Oral)   Resp 16   Ht 1.892 m (6' 2.5\")   Wt 110.8 kg (244 lb 4.8 oz)   SpO2 96%   BMI 30.95 kg/m    6' 2.5\"  244 lbs 4.8 oz    Physical Exam   GENERAL: healthy, alert and no distress  EYES: Eyes grossly normal to inspection, PERRL and conjunctivae and sclerae normal  RESP: lungs clear to auscultation - no rales, rhonchi or wheezes  CV: regular rate and rhythm, normal S1 S2, no S3 or S4, no murmur, click or rub, no peripheral edema and peripheral pulses strong  MS: no gross musculoskeletal defects noted, no edema  SKIN: no suspicious lesions or rashes  NEURO: Normal strength and tone, mentation intact and speech normal  PSYCH: mentation appears normal, affect normal/bright      Answers submitted by the patient for this visit:  General Questionnaire (Submitted on 10/24/2024)  Chief Complaint: Chronic problems general questions HPI Form  What is the reason for your visit today? : get a primary Doctor  How " many days per week do you miss taking your medication?: 0  Questionnaire about: Chronic problems general questions HPI Form (Submitted on 10/24/2024)  Chief Complaint: Chronic problems general questions HPI Form

## 2024-12-04 ENCOUNTER — OFFICE VISIT (OUTPATIENT)
Dept: RHEUMATOLOGY | Facility: CLINIC | Age: 74
End: 2024-12-04
Payer: COMMERCIAL

## 2024-12-04 ENCOUNTER — LAB (OUTPATIENT)
Dept: LAB | Facility: CLINIC | Age: 74
End: 2024-12-04
Payer: COMMERCIAL

## 2024-12-04 VITALS
HEART RATE: 72 BPM | SYSTOLIC BLOOD PRESSURE: 119 MMHG | DIASTOLIC BLOOD PRESSURE: 70 MMHG | WEIGHT: 239.8 LBS | BODY MASS INDEX: 30.38 KG/M2 | OXYGEN SATURATION: 98 %

## 2024-12-04 DIAGNOSIS — E78.5 HYPERLIPIDEMIA LDL GOAL <100: Primary | ICD-10-CM

## 2024-12-04 DIAGNOSIS — M25.50 POLYARTHRALGIA: ICD-10-CM

## 2024-12-04 DIAGNOSIS — R76.8 ANA POSITIVE: ICD-10-CM

## 2024-12-04 DIAGNOSIS — M19.90 INFLAMMATORY ARTHRITIS: Primary | ICD-10-CM

## 2024-12-04 DIAGNOSIS — Z79.899 HIGH RISK MEDICATION USE: ICD-10-CM

## 2024-12-04 DIAGNOSIS — M19.90 INFLAMMATORY ARTHRITIS: ICD-10-CM

## 2024-12-04 LAB
ALBUMIN SERPL BCG-MCNC: 4 G/DL (ref 3.5–5.2)
ALBUMIN UR-MCNC: NEGATIVE MG/DL
ALT SERPL W P-5'-P-CCNC: 11 U/L (ref 0–70)
APPEARANCE UR: CLEAR
BACTERIA #/AREA URNS HPF: ABNORMAL /HPF
BILIRUB UR QL STRIP: NEGATIVE
CHOLEST SERPL-MCNC: 153 MG/DL
COLOR UR AUTO: YELLOW
CREAT SERPL-MCNC: 0.92 MG/DL (ref 0.67–1.17)
CRP SERPL-MCNC: 20 MG/L
EGFRCR SERPLBLD CKD-EPI 2021: 86 ML/MIN/1.73M2
ERYTHROCYTE [DISTWIDTH] IN BLOOD BY AUTOMATED COUNT: 12.7 % (ref 10–15)
ERYTHROCYTE [SEDIMENTATION RATE] IN BLOOD BY WESTERGREN METHOD: 25 MM/HR (ref 0–20)
FASTING STATUS PATIENT QL REPORTED: NORMAL
GLUCOSE UR STRIP-MCNC: NEGATIVE MG/DL
HCT VFR BLD AUTO: 40.1 % (ref 40–53)
HDLC SERPL-MCNC: 56 MG/DL
HGB BLD-MCNC: 13.3 G/DL (ref 13.3–17.7)
HGB UR QL STRIP: ABNORMAL
KETONES UR STRIP-MCNC: NEGATIVE MG/DL
LDLC SERPL CALC-MCNC: 86 MG/DL
LEUKOCYTE ESTERASE UR QL STRIP: NEGATIVE
MCH RBC QN AUTO: 30.2 PG (ref 26.5–33)
MCHC RBC AUTO-ENTMCNC: 33.2 G/DL (ref 31.5–36.5)
MCV RBC AUTO: 91 FL (ref 78–100)
NITRATE UR QL: NEGATIVE
NONHDLC SERPL-MCNC: 97 MG/DL
PH UR STRIP: 6 [PH] (ref 5–8)
PLATELET # BLD AUTO: 281 10E3/UL (ref 150–450)
RBC # BLD AUTO: 4.4 10E6/UL (ref 4.4–5.9)
RBC #/AREA URNS AUTO: ABNORMAL /HPF
SP GR UR STRIP: 1.02 (ref 1–1.03)
SQUAMOUS #/AREA URNS AUTO: ABNORMAL /LPF
TRIGL SERPL-MCNC: 55 MG/DL
UROBILINOGEN UR STRIP-ACNC: 0.2 E.U./DL
WBC # BLD AUTO: 8.4 10E3/UL (ref 4–11)
WBC #/AREA URNS AUTO: ABNORMAL /HPF

## 2024-12-04 PROCEDURE — 36415 COLL VENOUS BLD VENIPUNCTURE: CPT

## 2024-12-04 PROCEDURE — 99214 OFFICE O/P EST MOD 30 MIN: CPT | Performed by: INTERNAL MEDICINE

## 2024-12-04 PROCEDURE — 80061 LIPID PANEL: CPT

## 2024-12-04 PROCEDURE — 86160 COMPLEMENT ANTIGEN: CPT

## 2024-12-04 PROCEDURE — 86036 ANCA SCREEN EACH ANTIBODY: CPT

## 2024-12-04 PROCEDURE — 82040 ASSAY OF SERUM ALBUMIN: CPT

## 2024-12-04 PROCEDURE — 85027 COMPLETE CBC AUTOMATED: CPT

## 2024-12-04 PROCEDURE — G2211 COMPLEX E/M VISIT ADD ON: HCPCS | Performed by: INTERNAL MEDICINE

## 2024-12-04 PROCEDURE — 86225 DNA ANTIBODY NATIVE: CPT

## 2024-12-04 PROCEDURE — 85652 RBC SED RATE AUTOMATED: CPT

## 2024-12-04 PROCEDURE — 82565 ASSAY OF CREATININE: CPT

## 2024-12-04 PROCEDURE — 84460 ALANINE AMINO (ALT) (SGPT): CPT

## 2024-12-04 PROCEDURE — 81001 URINALYSIS AUTO W/SCOPE: CPT

## 2024-12-04 PROCEDURE — 86140 C-REACTIVE PROTEIN: CPT

## 2024-12-04 RX ORDER — ESCITALOPRAM OXALATE 10 MG/1
10 TABLET ORAL DAILY
COMMUNITY
Start: 2024-11-21

## 2024-12-04 RX ORDER — PREDNISONE 2.5 MG/1
TABLET ORAL
Qty: 285 TABLET | Refills: 0 | Status: SHIPPED | OUTPATIENT
Start: 2024-12-04 | End: 2025-02-02

## 2024-12-04 RX ORDER — PREDNISONE 2.5 MG/1
2.5 TABLET ORAL DAILY
COMMUNITY
Start: 2024-10-03

## 2024-12-04 RX ORDER — METHOTREXATE 2.5 MG/1
10 TABLET ORAL
Qty: 16 TABLET | Refills: 0 | Status: SHIPPED | OUTPATIENT
Start: 2024-12-04 | End: 2025-01-03

## 2024-12-04 NOTE — PROGRESS NOTES
Rheumatology follow-up visit note     Stoney is a 74 year old male presents today for follow-up.    Doroteo was seen today for recheck.    Diagnoses and all orders for this visit:    Inflammatory arthritis  -     Complement C3; Future  -     Complement C4; Future  -     DNA double stranded antibodies; Future  -     LATISHA antibody panel; Future  -     Erythrocyte sedimentation rate auto; Future  -     CRP inflammation; Future  -     UA with Microscopic; Future  -     ANCA IgG by IFA with Reflex to Titer; Future  -     predniSONE (DELTASONE) 2.5 MG tablet; Take 6 tablets (15 mg) by mouth daily for 15 days, THEN 5 tablets (12.5 mg) daily for 15 days, THEN 4 tablets (10 mg) daily.  -     methotrexate 2.5 MG tablet; Take 4 tablets (10 mg) by mouth every 7 days.    Polyarthralgia  -     Complement C3; Future  -     Complement C4; Future  -     DNA double stranded antibodies; Future  -     LATISHA antibody panel; Future  -     Erythrocyte sedimentation rate auto; Future  -     CRP inflammation; Future    High risk medication use  -     ALT; Standing  -     Albumin level; Standing  -     CBC with platelets; Standing  -     Creatinine; Standing    LINSEY positive  -     Complement C3; Future  -     Complement C4; Future  -     DNA double stranded antibodies; Future  -     LATISHA antibody panel; Future  -     Erythrocyte sedimentation rate auto; Future  -     CRP inflammation; Future  -     UA with Microscopic; Future  -     ANCA IgG by IFA with Reflex to Titer; Future  -     predniSONE (DELTASONE) 2.5 MG tablet; Take 6 tablets (15 mg) by mouth daily for 15 days, THEN 5 tablets (12.5 mg) daily for 15 days, THEN 4 tablets (10 mg) daily.  -     methotrexate 2.5 MG tablet; Take 4 tablets (10 mg) by mouth every 7 days.        This patient with severe inflammatory joint disease affecting small and large joints in the absence of the usual association for seronegative spondyloarthropathy, seronegative for rheumatoid factor anti-CCP antibody  "tenderness to be positive for LINSEY at 1: 160 to be interrogated further.  As he tapered prednisone his symptoms have recurred.  Will ask him to go to higher dose of prednisone starting him on methotrexate literature rationale were outlined.  Will check labs every 4 weeks.  After the first of 4 weeks should the labs within acceptable range increase methotrexate to 20 mg splitting half in the morning half at night.  Meanwhile gradual taper of prednisone as noted.    Follow up in 2 months.    HPI    Stoney Ramos is a 74 year old male is here for follow-up.  This is for inflammatory joint disease.  His recent workup was reviewed.  His positive LINSEY 1: 160.  Anti-CCP rheumatoid factor been negative he has acute phase response.  As he tapered his prednisone since his previous visit he has gotten worse.  He is hurting in his hands, elbows shoulders knees.  He is very apprehensive that he might fall and if he were to do that there will be nobody to pick him up his wife is not going to be able to do that.  He already requires help from her for day-to-day activities putting on socks and other orders.  He has noted pain level to be \"severe\" on the left hand and the wrist and right hand, worse in the morning when he cannot make a fist.  When he was on 10 mg a day he felt considerably better than where he is now.  He does not take alcohol.  Following is the excerpt from a previous note:    evaluation of polyarthralgias. He reports that while over the past year or so he has had some discomfort in his shoulder especially the right side that he would describes as catching phenomenon but able to manage his day-to-day activities without difficulty with some worsening earlier this year in January when he was seen at his physician's office and [facility] those data are available in the chart and reviewed. Few months ago it would appear around the summer this year he started experiencing worsening of the pain which was more " anterior and posterior affecting the right shoulder, there was some radiation around the right biceps he also had pain in the right wrist, right knee. The shoulder and the knee pain was the worst. When he was finally seen at his physician's office a question of PMR was entertained. He was started on prednisone initially 15 he increase that to 20. There was a brisk improvement to the order of around 50%. He then went on to have worsening pain in his right knee, that had not responded to the oral prednisone as was the case with the wrist that also was not all that responsive to that prednisone. He was seen in orthopedics and had aspiration of the right knee joint. Fortunately those data are available and consistent with an inflammatory arthropathy profile. No crystals it seems were obtained, definitely not reported. He remembers that after the aspiration corticosteroid injection was put in. Currently he is on prednisone 20 mg. Now he feels almost back to normal. He would certainly not feel that there is need to seek medical advice for his joint symptoms as it stands today. He remembers that during the peak of his symptoms his abduction of the right shoulder knee demonstrates that, would be no more than 30 to 40 degrees. Now he has full abduction. He describes history of gout in the first MTPs for which she is on allopurinol most recent uric acid was 5. He reports no personal or family history of psoriasis ulcerative colitis Crohn's disease there is no family history of lupus rheumatoid arthritis ankylosing spondylitis. 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 He is not however a smoker. He has noted some stress going on in terms of medical related issues.     DETAILED EXAMINATION  12/04/24  :    Vitals:    12/04/24 1158   BP: 119/70   BP Location: Right arm   Patient Position: Sitting   Cuff Size: Adult Large   Pulse: 72   SpO2: 98%   Weight: 108.8 kg (239 lb 12.8 oz)     Alert oriented. Head  including the face is examined for malar rash, heliotropes, scarring, lupus pernio. Eyes examined for redness such as in episcleritis/scleritis, periorbital lesions.   Neck/ Face examined for parotid gland swelling, range of motion of neck.  Left upper and lower and right upper and lower extremities examined for tenderness, swelling, warmth of the appendicular joints, range of motion, edema, rash.  Some of the important findings included: Synovitis in multiple joints in the MCPs wrists, right knee is warmer than the left.  Impingement in both shoulders.     Patient Active Problem List    Diagnosis Date Noted    Moderate recurrent major depression (H) 10/29/2024     Priority: Medium    Inflammatory arthritis 10/29/2024     Priority: Medium    Hypothyroidism, unspecified type 10/29/2024     Priority: Medium    Hyperlipidemia LDL goal <100 10/29/2024     Priority: Medium    Idiopathic chronic gout without tophus, unspecified site 10/29/2024     Priority: Medium     No past surgical history on file.   No past medical history on file.  No Known Allergies  Current Outpatient Medications   Medication Sig Dispense Refill    allopurinol (ZYLOPRIM) 300 MG tablet Take 300 mg by mouth daily. Taking 150 mg daily      atorvastatin (LIPITOR) 10 MG tablet       fexofenadine (ALLEGRA) 180 MG tablet Take 180 mg by mouth.      fluticasone (FLONASE) 50 MCG/ACT nasal spray Spray 2 sprays into both nostrils.      levothyroxine (SYNTHROID/LEVOTHROID) 50 MCG tablet Take 50 mcg by mouth daily.      testosterone (ANDROGEL 1.62 % PUMP) 20.25 MG/ACT gel Place 1 Pump (20.25 mg) onto the skin daily. Apply from dispenser to clean, dry, intact skin of the upper arms and shoulders. 75 g 1    valACYclovir (VALTREX) 500 MG tablet Take 500 mg by mouth. prn       family history is not on file.  Social Connections: Not on file          WBC Count   Date Value Ref Range Status   08/22/2024 8.2 4.0 - 11.0 10e3/uL Final     RBC Count   Date Value Ref Range  Status   08/22/2024 4.32 (L) 4.40 - 5.90 10e6/uL Final     Hemoglobin   Date Value Ref Range Status   08/22/2024 13.1 (L) 13.3 - 17.7 g/dL Final     Hematocrit   Date Value Ref Range Status   08/22/2024 39.7 (L) 40.0 - 53.0 % Final     MCV   Date Value Ref Range Status   08/22/2024 92 78 - 100 fL Final     MCH   Date Value Ref Range Status   08/22/2024 30.3 26.5 - 33.0 pg Final     Platelet Count   Date Value Ref Range Status   08/22/2024 217 150 - 450 10e3/uL Final     % Lymphocytes   Date Value Ref Range Status   08/22/2024 18 % Final     AST   Date Value Ref Range Status   08/22/2024 16 0 - 45 U/L Final     ALT   Date Value Ref Range Status   08/22/2024 18 0 - 70 U/L Final     Albumin   Date Value Ref Range Status   08/22/2024 4.0 3.5 - 5.2 g/dL Final     Alkaline Phosphatase   Date Value Ref Range Status   08/22/2024 74 40 - 150 U/L Final     Creatinine   Date Value Ref Range Status   08/22/2024 0.92 0.67 - 1.17 mg/dL Final     GFR Estimate   Date Value Ref Range Status   08/22/2024 86 >60 mL/min/1.73m2 Final     Comment:     eGFR calculated using 2021 CKD-EPI equation.     Erythrocyte Sedimentation Rate   Date Value Ref Range Status   10/03/2024 25 (H) 0 - 20 mm/hr Final

## 2024-12-05 LAB
ANCA AB PATTERN SER IF-IMP: NORMAL
C-ANCA TITR SER IF: NORMAL {TITER}
C3 SERPL-MCNC: 112 MG/DL (ref 81–157)
C4 SERPL-MCNC: 27 MG/DL (ref 13–39)
DSDNA AB SER-ACNC: 34 IU/ML
ENA SM IGG SER IA-ACNC: <0.7 U/ML
ENA SM IGG SER IA-ACNC: NEGATIVE
ENA SS-A AB SER IA-ACNC: 0.5 U/ML
ENA SS-A AB SER IA-ACNC: NEGATIVE
ENA SS-B IGG SER IA-ACNC: <0.6 U/ML
ENA SS-B IGG SER IA-ACNC: NEGATIVE
U1 SNRNP IGG SER IA-ACNC: 4.6 U/ML
U1 SNRNP IGG SER IA-ACNC: NEGATIVE

## 2025-01-21 ENCOUNTER — LAB (OUTPATIENT)
Dept: LAB | Facility: CLINIC | Age: 75
End: 2025-01-21
Payer: COMMERCIAL

## 2025-01-21 DIAGNOSIS — Z79.899 HIGH RISK MEDICATION USE: ICD-10-CM

## 2025-01-21 LAB
ALBUMIN SERPL BCG-MCNC: 4 G/DL (ref 3.5–5.2)
ALT SERPL W P-5'-P-CCNC: 13 U/L (ref 0–70)
CREAT SERPL-MCNC: 0.97 MG/DL (ref 0.67–1.17)
EGFRCR SERPLBLD CKD-EPI 2021: 80 ML/MIN/1.73M2
ERYTHROCYTE [DISTWIDTH] IN BLOOD BY AUTOMATED COUNT: 13.5 % (ref 10–15)
HCT VFR BLD AUTO: 40.3 % (ref 40–53)
HGB BLD-MCNC: 13.3 G/DL (ref 13.3–17.7)
MCH RBC QN AUTO: 29.6 PG (ref 26.5–33)
MCHC RBC AUTO-ENTMCNC: 33 G/DL (ref 31.5–36.5)
MCV RBC AUTO: 90 FL (ref 78–100)
PLATELET # BLD AUTO: 191 10E3/UL (ref 150–450)
RBC # BLD AUTO: 4.49 10E6/UL (ref 4.4–5.9)
WBC # BLD AUTO: 6 10E3/UL (ref 4–11)

## 2025-01-21 PROCEDURE — 82040 ASSAY OF SERUM ALBUMIN: CPT

## 2025-01-21 PROCEDURE — 85027 COMPLETE CBC AUTOMATED: CPT

## 2025-01-21 PROCEDURE — 36415 COLL VENOUS BLD VENIPUNCTURE: CPT

## 2025-01-21 PROCEDURE — 84460 ALANINE AMINO (ALT) (SGPT): CPT

## 2025-01-21 PROCEDURE — 82565 ASSAY OF CREATININE: CPT

## 2025-01-22 ENCOUNTER — MYC REFILL (OUTPATIENT)
Dept: FAMILY MEDICINE | Facility: CLINIC | Age: 75
End: 2025-01-22
Payer: COMMERCIAL

## 2025-01-22 DIAGNOSIS — R79.89 LOW TESTOSTERONE IN MALE: ICD-10-CM

## 2025-01-23 RX ORDER — TESTOSTERONE 1.62 MG/G
1 GEL TRANSDERMAL DAILY
Qty: 75 G | Refills: 0 | Status: SHIPPED | OUTPATIENT
Start: 2025-01-23

## 2025-02-04 ENCOUNTER — OFFICE VISIT (OUTPATIENT)
Dept: RHEUMATOLOGY | Facility: CLINIC | Age: 75
End: 2025-02-04
Payer: COMMERCIAL

## 2025-02-04 VITALS
BODY MASS INDEX: 32.49 KG/M2 | HEART RATE: 56 BPM | SYSTOLIC BLOOD PRESSURE: 106 MMHG | OXYGEN SATURATION: 98 % | WEIGHT: 256.5 LBS | DIASTOLIC BLOOD PRESSURE: 52 MMHG

## 2025-02-04 DIAGNOSIS — Z79.899 HIGH RISK MEDICATION USE: ICD-10-CM

## 2025-02-04 DIAGNOSIS — M25.50 POLYARTHRALGIA: ICD-10-CM

## 2025-02-04 DIAGNOSIS — M19.90 INFLAMMATORY ARTHRITIS: Primary | ICD-10-CM

## 2025-02-04 DIAGNOSIS — R76.8 ANA POSITIVE: ICD-10-CM

## 2025-02-04 PROCEDURE — G2211 COMPLEX E/M VISIT ADD ON: HCPCS | Performed by: INTERNAL MEDICINE

## 2025-02-04 PROCEDURE — 99214 OFFICE O/P EST MOD 30 MIN: CPT | Performed by: INTERNAL MEDICINE

## 2025-02-04 RX ORDER — METHOTREXATE 2.5 MG/1
10 TABLET ORAL
Qty: 16 TABLET | Refills: 0 | Status: SHIPPED | OUTPATIENT
Start: 2025-02-04

## 2025-02-04 RX ORDER — FOLIC ACID 1 MG/1
1 TABLET ORAL DAILY
Qty: 90 TABLET | Refills: 0 | Status: SHIPPED | OUTPATIENT
Start: 2025-02-04 | End: 2025-05-05

## 2025-02-04 RX ORDER — PREDNISONE 2.5 MG/1
TABLET ORAL
Qty: 126 TABLET | Refills: 0 | Status: SHIPPED | OUTPATIENT
Start: 2025-02-04 | End: 2025-04-08

## 2025-02-04 NOTE — PROGRESS NOTES
Rheumatology follow-up visit lolly Lua is a 75 year old male presents today for follow-up.    Doroteo was seen today for recheck.    Diagnoses and all orders for this visit:    Inflammatory arthritis  -     methotrexate 2.5 MG tablet; Take 4 tablets (10 mg) by mouth every 7 days.  -     predniSONE (DELTASONE) 2.5 MG tablet; Take 3 tablets (7.5 mg) by mouth daily for 21 days, THEN 2 tablets (5 mg) daily for 21 days, THEN 1 tablet (2.5 mg) daily for 21 days.  -     folic acid (FOLVITE) 1 MG tablet; Take 1 tablet (1 mg) by mouth daily.    LINSEY positive  -     methotrexate 2.5 MG tablet; Take 4 tablets (10 mg) by mouth every 7 days.  -     predniSONE (DELTASONE) 2.5 MG tablet; Take 3 tablets (7.5 mg) by mouth daily for 21 days, THEN 2 tablets (5 mg) daily for 21 days, THEN 1 tablet (2.5 mg) daily for 21 days.  -     folic acid (FOLVITE) 1 MG tablet; Take 1 tablet (1 mg) by mouth daily.    Polyarthralgia    High risk medication use        The longitudinal plan of care for the diagnosis(es)/condition(s) as documented were addressed during this visit. Due to the added complexity in care, I will continue to support Doroteo in the subsequent management and with ongoing continuity of care.      Follow up in 2 months.    HPI    Stoney Ramos is a 75 year old male is here for follow-up of inflammatory joint disease.  He was found to have positive LINSEY 1: 160.  Anti-CCP rheumatoid factor were negative.  He has been tapering prednisone now down to 10 mg a day has not had recurrence of original symptoms.  He did take methotrexate as recommended but the labs in [place] declined to do his blood work for reasons unknown while he had the orders in his hands.  Therefore there is been a break in the methotrexate intake for the past 1 months we will have to start all over again.  Meanwhile given how well he is doing we will continue to taper prednisone.  This will be done as noted.  Will check labs in 4 weeks again, if within  "acceptable range consider increasing methotrexate to the next dose.  Will meet here in 2 months with labs every 4 weeks going forward.    Following is the excerpt from a previous note:      inflammatory joint disease. His recent workup was reviewed. His positive LINSEY 1: 160. Anti-CCP rheumatoid factor been negative he has acute phase response. As he tapered his prednisone since his previous visit he has gotten worse. He is hurting in his hands, elbows shoulders knees. He is very apprehensive that he might fall and if he were to do that there will be nobody to pick him up his wife is not going to be able to do that. He already requires help from her for day-to-day activities putting on socks and other orders. He has noted pain level to be \"severe\" on the left hand and the wrist and right hand, worse in the morning when he cannot make a fist. When he was on 10 mg a day he felt considerably better than where he is now. He does not take alcohol.     DETAILED EXAMINATION  02/04/25  :    Vitals:    02/04/25 1059   BP: 106/52   BP Location: Right arm   Patient Position: Sitting   Cuff Size: Adult Large   Pulse: 56   SpO2: 98%   Weight: 116.3 kg (256 lb 8 oz)     Alert oriented. Head including the face is examined for malar rash, heliotropes, scarring, lupus pernio. Eyes examined for redness such as in episcleritis/scleritis, periorbital lesions.   Neck/ Face examined for parotid gland swelling, range of motion of neck.  Left upper and lower and right upper and lower extremities examined for tenderness, swelling, warmth of the appendicular joints, range of motion, edema, rash.  Some of the important findings included: he does not have evidence of synovitis in the palpable joints of the upper extremities.  No significant deformities of the digits.  no Heberden nodes.  Range of motion of the shoulders  show full abduction.  No JLT effusion or warmth of the knees.  he does not have dactylitis of the digits.     Patient Active " Problem List    Diagnosis Date Noted    Moderate recurrent major depression (H) 10/29/2024     Priority: Medium    Inflammatory arthritis 10/29/2024     Priority: Medium    Hypothyroidism, unspecified type 10/29/2024     Priority: Medium    Hyperlipidemia LDL goal <100 10/29/2024     Priority: Medium    Idiopathic chronic gout without tophus, unspecified site 10/29/2024     Priority: Medium     No past surgical history on file.   No past medical history on file.  No Known Allergies  Current Outpatient Medications   Medication Sig Dispense Refill    allopurinol (ZYLOPRIM) 300 MG tablet Take 300 mg by mouth daily. Taking 150 mg daily      atorvastatin (LIPITOR) 10 MG tablet       calcium carbonate-vitamin D (OSCAL) 500-5 MG-MCG tablet Take 1 tablet by mouth.      escitalopram (LEXAPRO) 10 MG tablet Take 10 mg by mouth daily.      fluticasone (FLONASE) 50 MCG/ACT nasal spray Spray 2 sprays into both nostrils.      levothyroxine (SYNTHROID/LEVOTHROID) 50 MCG tablet Take 50 mcg by mouth daily.      methotrexate 2.5 MG tablet Take 4 tablets (10 mg) by mouth every 7 days. 16 tablet 0    predniSONE (DELTASONE) 2.5 MG tablet Take 2.5 mg by mouth daily.      testosterone (ANDROGEL 1.62 % PUMP) 20.25 MG/ACT gel Place 1 Pump (20.25 mg) onto the skin daily. Apply from dispenser to clean, dry, intact skin of the upper arms and shoulders. 75 g 0    valACYclovir (VALTREX) 500 MG tablet Take 500 mg by mouth. prn       family history is not on file.  Social Connections: Not on file          WBC Count   Date Value Ref Range Status   01/21/2025 6.0 4.0 - 11.0 10e3/uL Final     RBC Count   Date Value Ref Range Status   01/21/2025 4.49 4.40 - 5.90 10e6/uL Final     Hemoglobin   Date Value Ref Range Status   01/21/2025 13.3 13.3 - 17.7 g/dL Final     Hematocrit   Date Value Ref Range Status   01/21/2025 40.3 40.0 - 53.0 % Final     MCV   Date Value Ref Range Status   01/21/2025 90 78 - 100 fL Final     MCH   Date Value Ref Range Status    01/21/2025 29.6 26.5 - 33.0 pg Final     Platelet Count   Date Value Ref Range Status   01/21/2025 191 150 - 450 10e3/uL Final     % Lymphocytes   Date Value Ref Range Status   08/22/2024 18 % Final     AST   Date Value Ref Range Status   08/22/2024 16 0 - 45 U/L Final     ALT   Date Value Ref Range Status   01/21/2025 13 0 - 70 U/L Final     Albumin   Date Value Ref Range Status   01/21/2025 4.0 3.5 - 5.2 g/dL Final     Alkaline Phosphatase   Date Value Ref Range Status   08/22/2024 74 40 - 150 U/L Final     Creatinine   Date Value Ref Range Status   01/21/2025 0.97 0.67 - 1.17 mg/dL Final     GFR Estimate   Date Value Ref Range Status   01/21/2025 80 >60 mL/min/1.73m2 Final     Comment:     eGFR calculated using 2021 CKD-EPI equation.     Erythrocyte Sedimentation Rate   Date Value Ref Range Status   12/04/2024 25 (H) 0 - 20 mm/hr Final

## 2025-03-04 ENCOUNTER — LAB (OUTPATIENT)
Dept: LAB | Facility: CLINIC | Age: 75
End: 2025-03-04
Payer: COMMERCIAL

## 2025-03-04 DIAGNOSIS — Z79.899 HIGH RISK MEDICATION USE: ICD-10-CM

## 2025-03-04 LAB
ALBUMIN SERPL BCG-MCNC: 4.3 G/DL (ref 3.5–5.2)
ALT SERPL W P-5'-P-CCNC: 15 U/L (ref 0–70)
CREAT SERPL-MCNC: 1.13 MG/DL (ref 0.67–1.17)
EGFRCR SERPLBLD CKD-EPI 2021: 67 ML/MIN/1.73M2
ERYTHROCYTE [DISTWIDTH] IN BLOOD BY AUTOMATED COUNT: 14.3 % (ref 10–15)
HCT VFR BLD AUTO: 40.8 % (ref 40–53)
HGB BLD-MCNC: 14 G/DL (ref 13.3–17.7)
MCH RBC QN AUTO: 31.3 PG (ref 26.5–33)
MCHC RBC AUTO-ENTMCNC: 34.3 G/DL (ref 31.5–36.5)
MCV RBC AUTO: 91 FL (ref 78–100)
PLATELET # BLD AUTO: 204 10E3/UL (ref 150–450)
RBC # BLD AUTO: 4.48 10E6/UL (ref 4.4–5.9)
WBC # BLD AUTO: 7 10E3/UL (ref 4–11)

## 2025-03-04 PROCEDURE — 84460 ALANINE AMINO (ALT) (SGPT): CPT

## 2025-03-04 PROCEDURE — 82040 ASSAY OF SERUM ALBUMIN: CPT

## 2025-03-04 PROCEDURE — 82565 ASSAY OF CREATININE: CPT

## 2025-03-04 PROCEDURE — 36415 COLL VENOUS BLD VENIPUNCTURE: CPT

## 2025-03-04 PROCEDURE — 85027 COMPLETE CBC AUTOMATED: CPT

## 2025-05-08 ENCOUNTER — PATIENT OUTREACH (OUTPATIENT)
Dept: CARE COORDINATION | Facility: CLINIC | Age: 75
End: 2025-05-08
Payer: COMMERCIAL

## 2025-05-21 ENCOUNTER — OFFICE VISIT (OUTPATIENT)
Dept: RHEUMATOLOGY | Facility: CLINIC | Age: 75
End: 2025-05-21
Payer: COMMERCIAL

## 2025-05-21 VITALS
OXYGEN SATURATION: 97 % | BODY MASS INDEX: 32.14 KG/M2 | WEIGHT: 253.7 LBS | HEART RATE: 78 BPM | SYSTOLIC BLOOD PRESSURE: 119 MMHG | DIASTOLIC BLOOD PRESSURE: 74 MMHG

## 2025-05-21 DIAGNOSIS — M19.90 INFLAMMATORY ARTHRITIS: ICD-10-CM

## 2025-05-21 DIAGNOSIS — M25.50 POLYARTHRALGIA: Primary | ICD-10-CM

## 2025-05-21 DIAGNOSIS — Z79.899 HIGH RISK MEDICATION USE: ICD-10-CM

## 2025-05-21 DIAGNOSIS — R76.8 ANA POSITIVE: ICD-10-CM

## 2025-05-21 PROCEDURE — G2211 COMPLEX E/M VISIT ADD ON: HCPCS | Performed by: INTERNAL MEDICINE

## 2025-05-21 PROCEDURE — 3078F DIAST BP <80 MM HG: CPT | Performed by: INTERNAL MEDICINE

## 2025-05-21 PROCEDURE — 99214 OFFICE O/P EST MOD 30 MIN: CPT | Performed by: INTERNAL MEDICINE

## 2025-05-21 PROCEDURE — 3074F SYST BP LT 130 MM HG: CPT | Performed by: INTERNAL MEDICINE

## 2025-05-21 RX ORDER — DESVENLAFAXINE 50 MG/1
1 TABLET, FILM COATED, EXTENDED RELEASE ORAL
COMMUNITY
Start: 2025-04-10

## 2025-05-21 NOTE — PROGRESS NOTES
"      Rheumatology follow-up visit note     Stoney is a 75 year old male presents today for follow-up.    Doroteo was seen today for recheck.    Diagnoses and all orders for this visit:    Polyarthralgia    Inflammatory arthritis    LINSEY positive    High risk medication use        The longitudinal plan of care for the diagnosis(es)/condition(s) as documented were addressed during this visit. Due to the added complexity in care, I will continue to support Doroteo in the subsequent management and with ongoing continuity of care.      Follow up in 3 months.    HPI    Stoney Ramos is a 75 year old male is here for follow-up of    inflammatory joint disease.  He was found to have positive LINSEY 1: 160.  Anti-CCP rheumatoid factor were negative.  Currently he is no longer on methotrexate having ran out not been able to refill.  He is taking half a tablet of 5 mg prednisone.  He noted very little if any pain mild at the last interfering with some of the day-to-day activities.  He has not observed swelling.  We discussed with the situation is currently.  He is somewhat ambivalent about going on and staying on one of the DMARDs.  He wonders if it would be \"always\" the case that he will be on some of these medications.  We had a detailed discussion into these aspects.  For now the following plan is obtained #1 he will discontinue prednisone that is essentially 2.5 mg a day and have outlined to him that it will be hard to justify continuing prescription for this he understands the pros and cons there.  #2 he can review the literature on hydroxychloroquine which is provided and it may be more suitable for his lifestyle of being here in [place] and back-and-forth and difficulties with lab obtaining.  He will let us know he he wants he wants to proceed.  Should there be a flareup of his joint symptoms we will meet in person sooner   otherwise we will meet her in 3 months.       Following is the excerpt from a previous note:     He has " been tapering prednisone now down to 10 mg a day has not had recurrence of original symptoms.  He did take methotrexate as recommended but the labs in [place] declined to do his blood work for reasons unknown while he had the orders in his hands.  Therefore there is been a break in the methotrexate intake for the past 1 months we will have to start all over again.  Meanwhile given how well he is doing we will continue to taper prednisone.  This will be done as noted.  Will check labs in 4 weeks again, if within acceptable range consider increasing methotrexate to the next dose.  Will meet here in 2 months with labs every 4 weeks going forward.           DETAILED EXAMINATION  05/21/25  :    There were no vitals filed for this visit.  Alert oriented. Head including the face is examined for malar rash, heliotropes, scarring, lupus pernio. Eyes examined for redness such as in episcleritis/scleritis, periorbital lesions.   Neck/ Face examined for parotid gland swelling, range of motion of neck.  Left upper and lower and right upper and lower extremities examined for tenderness, swelling, warmth of the appendicular joints, range of motion, edema, rash.  Some of the important findings included: he does not have evidence of synovitis in the palpable joints of the upper extremities.  No significant deformities of the digits.  NO Heberden nodes.  Range of motion of the shoulders   show full abduction.  No JLT effusion or warmth of the knees.  he does not have dactylitis of the digits.     Patient Active Problem List    Diagnosis Date Noted    Moderate recurrent major depression (H) 10/29/2024     Priority: Medium    Inflammatory arthritis 10/29/2024     Priority: Medium    Hypothyroidism, unspecified type 10/29/2024     Priority: Medium    Hyperlipidemia LDL goal <100 10/29/2024     Priority: Medium    Idiopathic chronic gout without tophus, unspecified site 10/29/2024     Priority: Medium     No past surgical history on  file.   No past medical history on file.  No Known Allergies  Current Outpatient Medications   Medication Sig Dispense Refill    allopurinol (ZYLOPRIM) 300 MG tablet Take 300 mg by mouth daily. Taking 150 mg daily      atorvastatin (LIPITOR) 10 MG tablet       calcium carbonate-vitamin D (OSCAL) 500-5 MG-MCG tablet Take 1 tablet by mouth. (Patient not taking: Reported on 2/4/2025)      escitalopram (LEXAPRO) 10 MG tablet Take 10 mg by mouth daily.      fluticasone (FLONASE) 50 MCG/ACT nasal spray Spray 2 sprays into both nostrils.      levothyroxine (SYNTHROID/LEVOTHROID) 50 MCG tablet Take 50 mcg by mouth daily.      methotrexate 2.5 MG tablet Take 4 tablets (10 mg) by mouth every 7 days. 16 tablet 0    testosterone (ANDROGEL 1.62 % PUMP) 20.25 MG/ACT gel Place 1 Pump (20.25 mg) onto the skin daily. Apply from dispenser to clean, dry, intact skin of the upper arms and shoulders. 75 g 0    valACYclovir (VALTREX) 500 MG tablet Take 500 mg by mouth. prn       family history is not on file.  Social Connections: Moderately Isolated (4/7/2025)    Received from [facility].    Social Connection and Isolation Panel [NHANES]     Frequency of Communication with Friends and Family: More than three times a week     Frequency of Social Gatherings with Friends and Family: Once a week     Attends Sikh Services: Never     Active Member of Clubs or Organizations: No     Attends Club or Organization Meetings: Never     Marital Status:           WBC Count   Date Value Ref Range Status   03/04/2025 7.0 4.0 - 11.0 10e3/uL Final     RBC Count   Date Value Ref Range Status   03/04/2025 4.48 4.40 - 5.90 10e6/uL Final     Hemoglobin   Date Value Ref Range Status   03/04/2025 14.0 13.3 - 17.7 g/dL Final     Hematocrit   Date Value Ref Range Status   03/04/2025 40.8 40.0 - 53.0 % Final     MCV   Date Value Ref Range Status   03/04/2025 91 78 - 100 fL Final     MCH   Date Value Ref Range Status   03/04/2025 31.3 26.5 -  33.0 pg Final     Platelet Count   Date Value Ref Range Status   03/04/2025 204 150 - 450 10e3/uL Final     % Lymphocytes   Date Value Ref Range Status   08/22/2024 18 % Final     AST   Date Value Ref Range Status   08/22/2024 16 0 - 45 U/L Final     ALT   Date Value Ref Range Status   03/04/2025 15 0 - 70 U/L Final     Albumin   Date Value Ref Range Status   03/04/2025 4.3 3.5 - 5.2 g/dL Final     Alkaline Phosphatase   Date Value Ref Range Status   08/22/2024 74 40 - 150 U/L Final     Creatinine   Date Value Ref Range Status   03/04/2025 1.13 0.67 - 1.17 mg/dL Final     GFR Estimate   Date Value Ref Range Status   03/04/2025 67 >60 mL/min/1.73m2 Final     Comment:     eGFR calculated using 2021 CKD-EPI equation.     Erythrocyte Sedimentation Rate   Date Value Ref Range Status   12/04/2024 25 (H) 0 - 20 mm/hr Final

## 2025-07-09 ENCOUNTER — ANCILLARY PROCEDURE (OUTPATIENT)
Dept: ULTRASOUND IMAGING | Facility: CLINIC | Age: 75
End: 2025-07-09
Attending: INTERNAL MEDICINE
Payer: COMMERCIAL

## 2025-07-09 ENCOUNTER — OFFICE VISIT (OUTPATIENT)
Dept: PEDIATRICS | Facility: CLINIC | Age: 75
End: 2025-07-09
Payer: COMMERCIAL

## 2025-07-09 ENCOUNTER — NURSE TRIAGE (OUTPATIENT)
Dept: FAMILY MEDICINE | Facility: CLINIC | Age: 75
End: 2025-07-09
Payer: COMMERCIAL

## 2025-07-09 VITALS
TEMPERATURE: 97.1 F | WEIGHT: 251.8 LBS | RESPIRATION RATE: 16 BRPM | HEART RATE: 72 BPM | OXYGEN SATURATION: 96 % | HEIGHT: 75 IN | BODY MASS INDEX: 31.31 KG/M2 | DIASTOLIC BLOOD PRESSURE: 67 MMHG | SYSTOLIC BLOOD PRESSURE: 112 MMHG

## 2025-07-09 DIAGNOSIS — M79.89 LEG SWELLING: Primary | ICD-10-CM

## 2025-07-09 DIAGNOSIS — M79.89 LEG SWELLING: ICD-10-CM

## 2025-07-09 LAB
ALBUMIN SERPL-MCNC: 3.6 G/DL (ref 3.4–5)
ALP SERPL-CCNC: 70 U/L (ref 40–150)
ALT SERPL W P-5'-P-CCNC: 12 U/L (ref 0–70)
ANION GAP SERPL CALCULATED.3IONS-SCNC: 8 MMOL/L (ref 3–14)
AST SERPL W P-5'-P-CCNC: 22 U/L (ref 0–45)
BILIRUB SERPL-MCNC: 1.1 MG/DL (ref 0.2–1.3)
BUN SERPL-MCNC: 18 MG/DL (ref 7–30)
CALCIUM SERPL-MCNC: 9.5 MG/DL (ref 8.5–10.1)
CHLORIDE BLD-SCNC: 107 MMOL/L (ref 94–109)
CO2 SERPL-SCNC: 27 MMOL/L (ref 20–32)
CREAT SERPL-MCNC: 1 MG/DL (ref 0.66–1.25)
EGFRCR SERPLBLD CKD-EPI 2021: 78 ML/MIN/1.73M2
GLUCOSE BLD-MCNC: 103 MG/DL (ref 70–99)
POTASSIUM BLD-SCNC: 4.3 MMOL/L (ref 3.4–5.3)
PROT SERPL-MCNC: 7.1 G/DL (ref 6.8–8.8)
SODIUM SERPL-SCNC: 142 MMOL/L (ref 135–145)
URATE SERPL-MCNC: 5.7 MG/DL (ref 3.4–7)

## 2025-07-09 PROCEDURE — 3074F SYST BP LT 130 MM HG: CPT | Performed by: INTERNAL MEDICINE

## 2025-07-09 PROCEDURE — 99214 OFFICE O/P EST MOD 30 MIN: CPT | Performed by: INTERNAL MEDICINE

## 2025-07-09 PROCEDURE — 3078F DIAST BP <80 MM HG: CPT | Performed by: INTERNAL MEDICINE

## 2025-07-09 PROCEDURE — 36415 COLL VENOUS BLD VENIPUNCTURE: CPT | Performed by: INTERNAL MEDICINE

## 2025-07-09 PROCEDURE — 93971 EXTREMITY STUDY: CPT | Mod: RT

## 2025-07-09 PROCEDURE — 84550 ASSAY OF BLOOD/URIC ACID: CPT | Performed by: INTERNAL MEDICINE

## 2025-07-09 PROCEDURE — 80053 COMPREHEN METABOLIC PANEL: CPT | Performed by: INTERNAL MEDICINE

## 2025-07-09 NOTE — TELEPHONE ENCOUNTER
[facility] appropriate it sounds - needs in-person assessment and ultrasound if concern for DVT.  Can we get him setup?

## 2025-07-09 NOTE — TELEPHONE ENCOUNTER
Spoke with Chantal CLAUDIO who agreed to see pt at 2PM today. [facility] will call pt.    Edie Jarquin RN

## 2025-07-09 NOTE — TELEPHONE ENCOUNTER
Nurse Triage SBAR    Is this a 2nd Level Triage? YES, LICENSED PRACTITIONER REVIEW IS REQUIRED    Situation: Patient called in reporting concern for a blood clot in R calf.     Background: Patient states his R knee has been a bit swollen for a while, but this is not new.     Assessment: Patient states he noticed a tender lump in his R calf about 3 days ago. States it doesn't bother him when he walks, but is painful to touch. States the area is also warm to touch. He states the R ankle also has a little bit of swelling, which is new. Patient denies chest pain, SOB, fever.     Protocol Recommended Disposition:   Call [facility]/Go to ED/UCC Now (Or To Office with PCP Approval)    Recommendation: Per protocol, patient was advised should go to [facility]/ED/UCC Now (or to office with PCP approval). Patient is requesting a message be sent to his PCP to see if he can be seen in clinic. Advised patient to call back or seek emergency care immediately for any new or worsening symptoms. He verbalized understanding and had no further questions or concerns.      MINDA Tsai, RN             Reason for Disposition   Thigh, calf, or ankle swelling in only one leg    Additional Information   Negative: Looks like a broken bone or dislocated joint (e.g., crooked or deformed)   Negative: Sounds like a life-threatening emergency to the triager   Negative: Followed a hip injury   Negative: Followed a knee injury   Negative: Followed an ankle injury   Negative: Back pain radiating (shooting) into leg(s)   Negative: Foot pain is main symptom   Negative: Ankle pain is main symptom   Negative: Knee pain is main symptom   Negative: Leg swelling is main symptom   Negative: Chest pain   Negative: Difficulty breathing   Negative: Entire foot is cool or blue in comparison to other side   Negative: Unable to walk   Negative: Fever and red area (or area very tender to touch)   Negative: Swollen joint and fever   Negative: Thigh or calf pain in only one leg  and present > 1 hour    Protocols used: Leg Pain-A-[place]

## 2025-07-09 NOTE — PROGRESS NOTES
"[facility] Visit    Assessment & Plan     Leg swelling  Pleasant 75-year-old with an inflammatory arthritis and right upper calf pain and right ankle swelling.  Cyst is identified on ultrasound.  Looks like it potentially was draining.  Conservative management is discussed including ice and heat.  The patient does take ibuprofen on occasion I suggest taking 400 mg at a time for the anti-inflammatory properties.    Supportive cares otherwise discussed.  Patient is otherwise offered reassurance.    He is prescribed allopurinol for gout but is only taking a half dose.  Therefore I did check a uric acid level which is within reference range.  I think he can go ahead and continue dosing as he is.  - Uric acid  - Comprehensive metabolic panel  - US Lower Extremity Venous Duplex Right  - Uric acid  - Comprehensive metabolic panel    36 minutes were spent doing chart review, history and exam, documentation and further activities per the note.     BMI  Estimated body mass index is 31.9 kg/m  as calculated from the following:    Height as of this encounter: 1.892 m (6' 2.5\").    Weight as of this encounter: 114.2 kg (251 lb 12.8 oz).           Harris Sadler is a 75 year old, presenting for the following health issues:  Leg Pain    HPI Doroteo Ramos, 75 years    Right Calf Pain and Swelling  Reported soreness and swelling in the right calf and right ankle, both starting shortly before the day of the visit. Described a sore area with a lump in the right calf, not a round lump but elongated. Noted right ankle swelling with pitting edema and more pronounced than the left. No history of trauma or injury. No prior episodes of blood clots. No palpitations, trouble breathing, or shortness of breath. Left leg with mild swelling but less than right.    Inflammatory Arthritis  History of inflammatory arthritis managed by a rheumatologist. Not currently on rheumatologic medications. Chronic weakness and " "inflammation in the knees. Occasional knee inflammation and swelling. Right knee previously drained about a year ago due to severe swelling, followed by a course of prednisone with symptom improvement. No current prednisone use.    Gout  History of gout, managed with allopurinol for several years. Last uric acid checked 11 months ago, reported as normal at that time. Typically, allopurinol controls uric acid levels. No recent medication changes related to gout but only taking 1/2 prescribed dose.        Evaluation for possible DVT  Onset/Duration: 3 days  Description:       Location: Right calf       Redness: no        Pain: moderate       Warmth: YES       Joint swelling YES  Progression of symptoms same  Accompanying signs and symptoms:       Fevers: no        Numbness/tingling/weakness: YES       Chest pain/pleurisy: no        Shortness of breath: no   History        Trauma: no         Recent travel/when: no         Previous history of DVT: no         Family history of DVT: no         Recent surgery: no   Aggravating factors include: none  Therapies tried and outcome: nothing  Prior surgery on arteries of veins in this area: No              Objective    /67 (BP Location: Right arm, Patient Position: Sitting, Cuff Size: Adult Large)   Pulse 72   Temp 97.1  F (36.2  C)   Resp 16   Ht 1.892 m (6' 2.5\")   Wt 114.2 kg (251 lb 12.8 oz)   SpO2 96%   BMI 31.90 kg/m    Body mass index is 31.9 kg/m .  Physical Exam   GEN NAD  ENT MMM  CV RRR  EXT RLE +pedal edema.  Superior calf tenderness.  No discrete mass/lesions.              Signed Electronically by: Sawyer Silva MD    "

## 2025-08-21 ENCOUNTER — OFFICE VISIT (OUTPATIENT)
Dept: RHEUMATOLOGY | Facility: CLINIC | Age: 75
End: 2025-08-21
Payer: COMMERCIAL

## 2025-08-21 VITALS
DIASTOLIC BLOOD PRESSURE: 80 MMHG | BODY MASS INDEX: 31.62 KG/M2 | WEIGHT: 249.6 LBS | SYSTOLIC BLOOD PRESSURE: 124 MMHG | OXYGEN SATURATION: 98 % | HEART RATE: 62 BPM

## 2025-08-21 DIAGNOSIS — M25.50 POLYARTHRALGIA: ICD-10-CM

## 2025-08-21 DIAGNOSIS — M19.90 INFLAMMATORY ARTHRITIS: Primary | ICD-10-CM

## 2025-08-21 DIAGNOSIS — R76.8 ANA POSITIVE: ICD-10-CM

## 2025-08-21 DIAGNOSIS — Z79.899 HIGH RISK MEDICATION USE: ICD-10-CM

## 2025-08-21 LAB
ALBUMIN SERPL BCG-MCNC: 4.3 G/DL (ref 3.5–5.2)
ALT SERPL W P-5'-P-CCNC: 10 U/L (ref 0–70)
CREAT SERPL-MCNC: 0.9 MG/DL (ref 0.67–1.17)
CRP SERPL-MCNC: <3 MG/L
EGFRCR SERPLBLD CKD-EPI 2021: 88 ML/MIN/1.73M2
ERYTHROCYTE [DISTWIDTH] IN BLOOD BY AUTOMATED COUNT: 12.7 % (ref 10–15)
ERYTHROCYTE [SEDIMENTATION RATE] IN BLOOD BY WESTERGREN METHOD: 11 MM/HR (ref 0–20)
HCT VFR BLD AUTO: 39.6 % (ref 40–53)
HGB BLD-MCNC: 13.3 G/DL (ref 13.3–17.7)
MCH RBC QN AUTO: 30.4 PG (ref 26.5–33)
MCHC RBC AUTO-ENTMCNC: 33.6 G/DL (ref 31.5–36.5)
MCV RBC AUTO: 90.4 FL (ref 78–100)
PLATELET # BLD AUTO: 222 10E3/UL (ref 150–450)
RBC # BLD AUTO: 4.38 10E6/UL (ref 4.4–5.9)
RHEUMATOID FACT SERPL-ACNC: <10 IU/ML
WBC # BLD AUTO: 5.36 10E3/UL (ref 4–11)

## 2025-08-21 RX ORDER — FOLIC ACID 1 MG/1
1 TABLET ORAL DAILY
Qty: 90 TABLET | Refills: 0 | Status: SHIPPED | OUTPATIENT
Start: 2025-08-21 | End: 2025-11-19

## 2025-08-21 RX ORDER — PREDNISONE 2.5 MG/1
TABLET ORAL
Qty: 126 TABLET | Refills: 0 | Status: SHIPPED | OUTPATIENT
Start: 2025-08-21 | End: 2025-10-23

## 2025-08-21 RX ORDER — METHOTREXATE 2.5 MG/1
10 TABLET ORAL
Qty: 16 TABLET | Refills: 0 | Status: SHIPPED | OUTPATIENT
Start: 2025-08-21 | End: 2025-09-20